# Patient Record
Sex: FEMALE | Race: WHITE | NOT HISPANIC OR LATINO | Employment: FULL TIME | URBAN - METROPOLITAN AREA
[De-identification: names, ages, dates, MRNs, and addresses within clinical notes are randomized per-mention and may not be internally consistent; named-entity substitution may affect disease eponyms.]

---

## 2017-12-05 ENCOUNTER — HOSPITAL ENCOUNTER (OUTPATIENT)
Facility: MEDICAL CENTER | Age: 27
End: 2017-12-05
Attending: FAMILY MEDICINE
Payer: COMMERCIAL

## 2017-12-05 ENCOUNTER — OFFICE VISIT (OUTPATIENT)
Dept: URGENT CARE | Facility: PHYSICIAN GROUP | Age: 27
End: 2017-12-05
Payer: COMMERCIAL

## 2017-12-05 VITALS
HEART RATE: 89 BPM | DIASTOLIC BLOOD PRESSURE: 72 MMHG | BODY MASS INDEX: 22.15 KG/M2 | WEIGHT: 125 LBS | OXYGEN SATURATION: 99 % | TEMPERATURE: 97.8 F | HEIGHT: 63 IN | RESPIRATION RATE: 12 BRPM | SYSTOLIC BLOOD PRESSURE: 124 MMHG

## 2017-12-05 DIAGNOSIS — R30.0 DYSURIA: ICD-10-CM

## 2017-12-05 DIAGNOSIS — A74.9 CHLAMYDIA: ICD-10-CM

## 2017-12-05 DIAGNOSIS — N30.00 ACUTE CYSTITIS WITHOUT HEMATURIA: ICD-10-CM

## 2017-12-05 LAB
APPEARANCE UR: CLEAR
BILIRUB UR STRIP-MCNC: NORMAL MG/DL
COLOR UR AUTO: NORMAL
GLUCOSE UR STRIP.AUTO-MCNC: NORMAL MG/DL
KETONES UR STRIP.AUTO-MCNC: NORMAL MG/DL
LEUKOCYTE ESTERASE UR QL STRIP.AUTO: NORMAL
NITRITE UR QL STRIP.AUTO: NORMAL
PH UR STRIP.AUTO: 5 [PH] (ref 5–8)
PROT UR QL STRIP: NORMAL MG/DL
RBC UR QL AUTO: NORMAL
SP GR UR STRIP.AUTO: 1
UROBILINOGEN UR STRIP-MCNC: NORMAL MG/DL

## 2017-12-05 PROCEDURE — 81002 URINALYSIS NONAUTO W/O SCOPE: CPT | Performed by: FAMILY MEDICINE

## 2017-12-05 PROCEDURE — 87086 URINE CULTURE/COLONY COUNT: CPT

## 2017-12-05 PROCEDURE — 87491 CHLMYD TRACH DNA AMP PROBE: CPT

## 2017-12-05 PROCEDURE — 87591 N.GONORRHOEAE DNA AMP PROB: CPT

## 2017-12-05 PROCEDURE — 87077 CULTURE AEROBIC IDENTIFY: CPT

## 2017-12-05 PROCEDURE — 99214 OFFICE O/P EST MOD 30 MIN: CPT | Performed by: FAMILY MEDICINE

## 2017-12-05 RX ORDER — SULFAMETHOXAZOLE AND TRIMETHOPRIM 800; 160 MG/1; MG/1
1 TABLET ORAL EVERY 12 HOURS
Qty: 10 TAB | Refills: 0 | Status: SHIPPED | OUTPATIENT
Start: 2017-12-05 | End: 2017-12-10

## 2017-12-05 ASSESSMENT — ENCOUNTER SYMPTOMS
FOCAL WEAKNESS: 0
SENSORY CHANGE: 0
WEIGHT LOSS: 0
EYE REDNESS: 0
EYE DISCHARGE: 0

## 2017-12-05 NOTE — PROGRESS NOTES
"Subjective:      Ramona Caputo is a 27 y.o. female who presents with Painful Urination (x 2 days)            2 days urinary burning and urgency. No frequency. No hematuria. Currently on menses. No vaginal discharge. No fever. No flank pain. No PMH pyelonephritis. OTC ibuprofen with some relief.           Review of Systems   Constitutional: Negative for malaise/fatigue and weight loss.   Eyes: Negative for discharge and redness.   Skin: Negative for itching and rash.   Neurological: Negative for sensory change and focal weakness.          Objective:     /72   Pulse 89   Temp 36.6 °C (97.8 °F)   Resp 12   Ht 1.6 m (5' 3\")   Wt 56.7 kg (125 lb)   LMP 11/30/2017   SpO2 99%   BMI 22.14 kg/m²      Physical Exam            Assessment/Plan:     There are no diagnoses linked to this encounter.      "

## 2017-12-05 NOTE — LETTER
December 5, 2017          To Whom it may concern      Ramona Caputo was seen in our office today.  Please excuse her for any missed work today                    Jacob Michael MD

## 2017-12-05 NOTE — PROGRESS NOTES
"Subjective:      Ramona Caputo is a 27 y.o. female who presents with Painful Urination (x 2 days)            2 days dysuria, frequency. No hematuria. Scant vaginal discharge and concerned about STI. Has appt with Gyn but would like to test now if possible. No fever. No flank pain. No PMH pyelonephritis. Sx's moderate severity. No OTC medications. No other aggravating or alleviating factors.          Review of Systems   Constitutional: Negative for malaise/fatigue and weight loss.   Eyes: Negative for discharge and redness.   Musculoskeletal: Negative for joint pain and myalgias.   Skin: Negative for itching and rash.          Objective:     /72   Pulse 89   Temp 36.6 °C (97.8 °F)   Resp 12   Ht 1.6 m (5' 3\")   Wt 56.7 kg (125 lb)   LMP 11/30/2017   SpO2 99%   BMI 22.14 kg/m²      Physical Exam   Constitutional: She appears well-developed and well-nourished. No distress.   HENT:   Head: Normocephalic and atraumatic.   Mouth/Throat: Oropharynx is clear and moist.   Eyes: Conjunctivae are normal.   Cardiovascular: Normal rate, regular rhythm and normal heart sounds.    Pulmonary/Chest: Effort normal and breath sounds normal.   Genitourinary:   Genitourinary Comments: Deferred per patient request. She notes no visible lesions or sores.    Neurological:   Speech is clear. Patient is appropriate and cooperative.     Skin: Skin is warm and dry. No rash noted.               Assessment/Plan:     Ua reviewed    1. Acute cystitis without hematuria  POCT Urinalysis    Urine Culture    sulfamethoxazole-trimethoprim (BACTRIM DS) 800-160 MG tablet   2. Dysuria  CHLAMYDIA/GC PCR URINE OR SWAB   3. Chlamydia  azithromycin (ZITHROMAX) 250 MG Tab     Differential diagnosis, natural history, supportive care, and indications for immediate follow-up discussed at length.   Bactrim was stopped after urine culture reviewed.       "

## 2017-12-06 ENCOUNTER — TELEPHONE (OUTPATIENT)
Dept: URGENT CARE | Facility: PHYSICIAN GROUP | Age: 27
End: 2017-12-06

## 2017-12-06 LAB
C TRACH DNA SPEC QL NAA+PROBE: POSITIVE
N GONORRHOEA DNA SPEC QL NAA+PROBE: NEGATIVE
SPECIMEN SOURCE: ABNORMAL

## 2017-12-07 LAB
BACTERIA UR CULT: ABNORMAL
BACTERIA UR CULT: ABNORMAL
SIGNIFICANT IND 70042: ABNORMAL
SOURCE SOURCE: ABNORMAL

## 2017-12-07 RX ORDER — AZITHROMYCIN 250 MG/1
1000 TABLET, FILM COATED ORAL ONCE
Qty: 4 TAB | Refills: 0 | Status: SHIPPED | OUTPATIENT
Start: 2017-12-07 | End: 2017-12-07

## 2017-12-14 ASSESSMENT — ENCOUNTER SYMPTOMS
MYALGIAS: 0
EYE REDNESS: 0
EYE DISCHARGE: 0
WEIGHT LOSS: 0

## 2019-10-23 ENCOUNTER — HOSPITAL ENCOUNTER (OUTPATIENT)
Dept: RADIOLOGY | Facility: MEDICAL CENTER | Age: 29
End: 2019-10-23
Attending: PHYSICIAN ASSISTANT
Payer: COMMERCIAL

## 2019-10-23 ENCOUNTER — OFFICE VISIT (OUTPATIENT)
Dept: URGENT CARE | Facility: PHYSICIAN GROUP | Age: 29
End: 2019-10-23
Payer: COMMERCIAL

## 2019-10-23 VITALS
TEMPERATURE: 99.1 F | BODY MASS INDEX: 22.61 KG/M2 | OXYGEN SATURATION: 99 % | HEIGHT: 63 IN | DIASTOLIC BLOOD PRESSURE: 64 MMHG | HEART RATE: 78 BPM | SYSTOLIC BLOOD PRESSURE: 118 MMHG | WEIGHT: 127.6 LBS | RESPIRATION RATE: 16 BRPM

## 2019-10-23 DIAGNOSIS — S16.1XXA STRAIN OF NECK MUSCLE, INITIAL ENCOUNTER: ICD-10-CM

## 2019-10-23 DIAGNOSIS — G44.319 ACUTE POST-TRAUMATIC HEADACHE, NOT INTRACTABLE: ICD-10-CM

## 2019-10-23 DIAGNOSIS — S09.90XA INJURY OF HEAD, INITIAL ENCOUNTER: ICD-10-CM

## 2019-10-23 PROCEDURE — 70450 CT HEAD/BRAIN W/O DYE: CPT

## 2019-10-23 PROCEDURE — 99214 OFFICE O/P EST MOD 30 MIN: CPT | Performed by: PHYSICIAN ASSISTANT

## 2019-10-23 RX ORDER — NITROFURANTOIN 25; 75 MG/1; MG/1
CAPSULE ORAL
Refills: 0 | COMMUNITY
Start: 2019-10-10 | End: 2019-10-23

## 2019-10-23 RX ORDER — IBUPROFEN 600 MG/1
600 TABLET ORAL EVERY 6 HOURS PRN
Qty: 15 TAB | Refills: 0 | Status: SHIPPED | OUTPATIENT
Start: 2019-10-23 | End: 2019-10-28

## 2019-10-23 RX ORDER — ACYCLOVIR 400 MG/1
TABLET ORAL
Refills: 3 | COMMUNITY
Start: 2019-10-04 | End: 2019-10-23

## 2019-10-23 RX ORDER — CYCLOBENZAPRINE HCL 10 MG
10 TABLET ORAL
Qty: 7 TAB | Refills: 0 | Status: SHIPPED | OUTPATIENT
Start: 2019-10-23 | End: 2019-10-30

## 2019-10-23 SDOH — HEALTH STABILITY: MENTAL HEALTH: HOW MANY STANDARD DRINKS CONTAINING ALCOHOL DO YOU HAVE ON A TYPICAL DAY?: 1 OR 2

## 2019-10-23 SDOH — HEALTH STABILITY: MENTAL HEALTH: HOW OFTEN DO YOU HAVE A DRINK CONTAINING ALCOHOL?: MONTHLY OR LESS

## 2019-10-23 SDOH — HEALTH STABILITY: MENTAL HEALTH: HOW OFTEN DO YOU HAVE 6 OR MORE DRINKS ON ONE OCCASION?: NEVER

## 2019-10-23 ASSESSMENT — ENCOUNTER SYMPTOMS
CONSTIPATION: 0
HEADACHES: 1
TINGLING: 0
NAUSEA: 0
FEVER: 0
SEIZURES: 0
DIARRHEA: 0
DOUBLE VISION: 0
MEMORY LOSS: 0
CHILLS: 0
LOSS OF CONSCIOUSNESS: 1
DIZZINESS: 1
NUMBNESS: 0
SENSORY CHANGE: 0
DISORIENTATION: 0
COUGH: 0
WEAKNESS: 0
VOMITING: 0
FOCAL WEAKNESS: 0
BLURRED VISION: 0
SHORTNESS OF BREATH: 0
ABDOMINAL PAIN: 0
TREMORS: 0
SPEECH CHANGE: 0

## 2019-10-23 NOTE — LETTER
October 23, 2019         Patient: Ramona Caputo   YOB: 1990   Date of Visit: 10/23/2019           To Whom it May Concern:    Ramona Caputo was seen in my clinic on 10/23/2019. She may return to work on 10/25/19 or sooner if symptoms improve.    If you have any questions or concerns, please don't hesitate to call.        Sincerely,           Nadja Olson P.A.-C.  Electronically Signed

## 2019-10-23 NOTE — PATIENT INSTRUCTIONS
Concussion, Adult  A concussion, or closed-head injury, is a brain injury caused by a direct blow to the head or by a quick and sudden movement (jolt) of the head or neck. Concussions are usually not life-threatening. Even so, the effects of a concussion can be serious. If you have had a concussion before, you are more likely to experience concussion-like symptoms after a direct blow to the head.  What are the causes?  This condition is caused by:  · Direct blow to the head, such as from running into another player during a soccer game, being hit in a fight, or hitting your head on a hard surface.  · A jolt of the head or neck that causes the brain to move back and forth inside the skull, such as in a car crash.  What are the signs or symptoms?  The signs of a concussion can be hard to notice. Early on, they may be missed by you, family members, and health care providers. You may look fine but act or feel differently.  Symptoms are usually temporary, but they may last for days, weeks, or even longer. Some symptoms may appear right away while others may not show up for hours or days. Every head injury is different. Symptoms may include:  · Mild to moderate headaches that will not go away.  · A feeling of pressure inside your head.  · Having more trouble than usual:  ¨ Learning or remembering things you have heard.  ¨ Answering questions.  ¨ Paying attention or concentrating.  ¨ Organizing daily tasks.  ¨ Making decisions and solving problems.  · Slowness in thinking, acting or reacting, speaking, or reading.  · Getting lost or being easily confused.  · Feeling tired all the time or lacking energy (fatigued).  · Feeling drowsy.  · Sleep disturbances.  ¨ Sleeping more than usual.  ¨ Sleeping less than usual.  ¨ Trouble falling asleep.  ¨ Trouble sleeping (insomnia).  · Loss of balance or feeling lightheaded or dizzy.  · Nausea or vomiting.  · Numbness or tingling.  · Increased sensitivity  to:  ¨ Sounds.  ¨ Lights.  ¨ Distractions.  · Vision problems or eyes that tire easily.  · Diminished sense of taste or smell.  · Ringing in the ears.  · Mood changes such as feeling sad or anxious.  · Becoming easily irritated or angry for little or no reason.  · Lack of motivation.  · Seeing or hearing things other people do not see or hear (hallucinations).  How is this diagnosed?  This condition is diagnosed based on:  · Medical history. Your health care provider will ask for a description of your activity and your injury.  · Symptoms. Your health care provider will ask whether you passed out (lost consciousness) and whether you are having trouble remembering events that happened right before and during your injury.  You may also have other tests, including:  · A brain scan to look for signs of injury to the brain. You may still have a concussion even if the scan shows no injury.  · Blood tests to be sure other problems are not present.  How is this treated?  This condition is usually treated in an emergency department, in an urgent care, or at a clinic.  · Tell your health care provider if:  ¨ You are taking any medicines, including prescription medicines, over-the-counter medicines, and natural remedies. Some medicines, such as blood thinners (anticoagulants) and aspirin, may increase the chance of complications, such as bleeding.  ¨ You are taking or have taken alcohol or illegal drugs. Alcohol and certain other drugs may slow your recovery and can put you at risk of further injury.  · Your health care provider will send you home with important instructions to follow.  · How fast you will recover from a concussion depends on many factors, such as how severe your concussion is, what part of your brain was injured, how old you are, and how healthy you were before the concussion. Recovery can take time.  ¨ Most people with mild injuries recover fully.  ¨ Recovery is slower in older people.  ¨ People who have had  a concussion in the past or have other medical problems may take longer to recover.  Follow these instructions at home:  Activity  · Limit activities that require a lot of thought or concentration. These include:  ¨ Doing homework or job-related work.  ¨ Watching TV.  ¨ Working on the computer.  · Rest. Rest helps the brain to heal. Make sure you:  ¨ Get plenty of sleep at night. Avoid staying up late at night.  ¨ Keep the same bedtime hours on weekends and weekdays.  ¨ Rest during the day. Take daytime naps or rest breaks when you feel tired.  · Avoid situations that increase your risk for another head injury (football, hockey, soccer, basketball, martial arts, downhill snow sports, and horseback riding). Your condition will get worse every time you experience a concussion.  · Ask your health care provider when you can return to your normal activities, such as school, work, athletics, driving, riding a bicycle, or operating heavy machinery. Your ability to react may be slower after a brain injury. Never do these activities if you are dizzy.  · Return to your normal activities slowly, not all at once. You must give your body and brain enough time for recovery.  Preventing another concussion   It is very important to avoid another brain injury, especially as you recover. In rare cases, another injury can lead to permanent brain damage, brain swelling, or death. The risk of this is greatest during the first 7-10 days after a head injury. Avoid injuries by:  · Wearing a seat belt when riding in a car.  · Limiting alcohol intake to no more than 1 drink a day for non-pregnant women and 2 drinks a day for men. One drink equals 12 oz. of beer, 5 oz. of wine, or 1½ oz. of hard liquor.  · Wearing a helmet when biking, skiing, skateboarding, skating, or doing similar activities.  · Avoiding activities that could lead to a second concussion, such as contact or recreational sports, until your health care provider says it is  okay.  · Taking safety measures in your home.  ¨ Remove clutter and tripping hazards from floors and stairways.  ¨ Use grab bars in bathrooms and handrails by stairs.  ¨ Place non-slip mats on floors and in bathtubs.  ¨ Improve lighting in dim areas.  General instructions  · Take over-the-counter and prescription medicines only as told by your health care provider.  · Do not drink alcohol until your health care provider says you are well enough to do so.  · If it is harder than usual to remember things, write them down.  · If you are easily distracted, try to do one thing at a time. For example, do not try to watch TV while fixing dinner.  · Talk with family members or close friends when making important decisions.  · Watch your symptoms and tell others to do the same. Complications sometimes occur after a concussion. Older adults with a brain injury may have a higher risk of serious complications, such as a blood clot on the brain.  · Tell your teachers, school nurse, school counselor, , , or  about your injury, symptoms, and restrictions. Tell them about what you can or cannot do. They should watch for:  ¨ Increased problems with attention or concentration.  ¨ Increased difficulty remembering or learning new information.  ¨ Increased time needed to complete tasks or assignments.  ¨ Increased irritability or decreased ability to cope with stress.  ¨ Increased symptoms.  · Keep all follow-up visits as told by your health care provider. This is important because repeated evaluation of your symptoms is recommended for your recovery.  Contact a health care provider if:  · You have increased problems paying attention or concentrating.  · You have increased difficulty remembering or learning new information.  · You need more time to complete tasks or assignments than before.  · You have increased irritability or decreased ability to cope with stress.  · You have more symptoms than  before.  Seek medical care if you have any of the following symptoms for more than 2 weeks after your injury:  · Long-lasting (chronic) headaches.  · Dizziness or balance problems.  · Nausea.  · Vision problems.  · Increased sensitivity to noise or light.  · Depression or mood swings.  · Anxiety or irritability.  · Memory problems.  · Difficulty concentrating or paying attention.  · Sleep problems.  · Feeling tired all the time.  Get help right away if:  · You have severe or worsening headaches. These may be a sign of a blood clot in the brain.  · You have weakness (even if only in one hand, leg, or part of the face).  · You have numbness.  · You have decreased coordination.  · You vomit repeatedly.  · You have increased sleepiness.  · One pupil is larger than the other.  · You have convulsions.  · You have slurred speech.  · You have increased confusion. This may be a sign of a blood clot in the brain.  · You have increased restlessness, agitation, or irritability.  · You are unable to recognize people or places.  · You have neck pain.  · It is difficult to wake you up.  · You have unusual behavior changes.  · You lose consciousness.  This information is not intended to replace advice given to you by your health care provider. Make sure you discuss any questions you have with your health care provider.  Document Released: 03/09/2005 Document Revised: 05/25/2017 Document Reviewed: 07/10/2014  [employer] Interactive Patient Education © 2017 [employer]        Cervical Strain and Sprain Rehab  Ask your health care provider which exercises are safe for you. Do exercises exactly as told by your health care provider and adjust them as directed. It is normal to feel mild stretching, pulling, tightness, or discomfort as you do these exercises, but you should stop right away if you feel sudden pain or your pain gets worse. Do not begin these exercises until told by your health care provider.  Stretching and range of motion  exercises  These exercises warm up your muscles and joints and improve the movement and flexibility of your neck. These exercises also help to relieve pain, numbness, and tingling.  Exercise A: Cervical side bend  1. Using good posture, sit on a stable chair or stand up.  2. Without moving your shoulders, slowly tilt your left / right ear to your shoulder until you feel a stretch in your neck muscles. You should be looking straight ahead.  3. Hold for __________ seconds.  4. Repeat with the other side of your neck.  Repeat __________ times. Complete this exercise __________ times a day.  Exercise B: Cervical rotation  1. Using good posture, sit on a stable chair or stand up.  2. Slowly turn your head to the side as if you are looking over your left / right shoulder.  ¨ Keep your eyes level with the ground.  ¨ Stop when you feel a stretch along the side and the back of your neck.  3. Hold for __________ seconds.  4. Repeat this by turning to your other side.  Repeat __________ times. Complete this exercise __________ times a day.  Exercise C: Thoracic extension and pectoral stretch  1. Roll a towel or a small blanket so it is about 4 inches (10 cm) in diameter.  2. Lie down on your back on a firm surface.  3. Put the towel lengthwise, under your spine in the middle of your back. It should not be not under your shoulder blades. The towel should line up with your spine from your middle back to your lower back.  4. Put your hands behind your head and let your elbows fall out to your sides.  5. Hold for __________ seconds.  Repeat __________ times. Complete this exercise __________ times a day.  Strengthening exercises  These exercises build strength and endurance in your neck. Endurance is the ability to use your muscles for a long time, even after your muscles get tired.  Exercise D: Upper cervical flexion, isometric  1. Lie on your back with a thin pillow behind your head and a small rolled-up towel under your  neck.  2. Gently tuck your chin toward your chest and nod your head down to look toward your feet. Do not lift your head off the pillow.  3. Hold for __________ seconds.  4. Release the tension slowly. Relax your neck muscles completely before you repeat this exercise.  Repeat __________ times. Complete this exercise __________ times a day.  Exercise E: Cervical extension, isometric  1. Stand about 6 inches (15 cm) away from a wall, with your back facing the wall.  2. Place a soft object, about 6-8 inches (15-20 cm) in diameter, between the back of your head and the wall. A soft object could be a small pillow, a ball, or a folded towel.  3. Gently tilt your head back and press into the soft object. Keep your jaw and forehead relaxed.  4. Hold for __________ seconds.  5. Release the tension slowly. Relax your neck muscles completely before you repeat this exercise.  Repeat __________ times. Complete this exercise __________ times a day.  Posture and body mechanics     Body mechanics refers to the movements and positions of your body while you do your daily activities. Posture is part of body mechanics. Good posture and healthy body mechanics can help to relieve stress in your body's tissues and joints. Good posture means that your spine is in its natural S-curve position (your spine is neutral), your shoulders are pulled back slightly, and your head is not tipped forward. The following are general guidelines for applying improved posture and body mechanics to your everyday activities.  Standing  · When standing, keep your spine neutral and keep your feet about hip-width apart. Keep a slight bend in your knees. Your ears, shoulders, and hips should line up.  · When you do a task in which you  one place for a long time, place one foot up on a stable object that is 2-4 inches (5-10 cm) high, such as a footstool. This helps keep your spine neutral.  Sitting  · When sitting, keep your spine neutral and your keep  feet flat on the floor. Use a footrest, if necessary, and keep your thighs parallel to the floor. Avoid rounding your shoulders, and avoid tilting your head forward.  · When working at a desk or a computer, keep your desk at a height where your hands are slightly lower than your elbows. Slide your chair under your desk so you are close enough to maintain good posture.  · When working at a computer, place your monitor at a height where you are looking straight ahead and you do not have to tilt your head forward or downward to look at the screen.  Resting  When lying down and resting, avoid positions that are most painful for you. Try to support your neck in a neutral position. You can use a contour pillow or a small rolled-up towel. Your pillow should support your neck but not push on it.  This information is not intended to replace advice given to you by your health care provider. Make sure you discuss any questions you have with your health care provider.  Document Released: 12/18/2006 Document Revised: 08/24/2017 Document Reviewed: 11/23/2016  [employer] Interactive Patient Education © 2017 [employer]

## 2019-10-23 NOTE — PROGRESS NOTES
Subjective:   Ramona Caputo is a 29 y.o. female who presents for Head Injury (Fell off son's Skateboard yesterday and was knocked out)        Head Injury    The incident occurred 12 to 24 hours ago. The injury mechanism was a fall. She lost consciousness for a period of less than one minute. The quality of the pain is described as band-like. Associated symptoms include headaches. Pertinent negatives include no blurred vision, disorientation, memory loss, numbness, vomiting or weakness. She has tried NSAIDs for the symptoms. The treatment provided mild relief.     The patient was standing on her son's skateboard when she fell backwards hitting the back of her head on cement.  She did lose consciousness, she is unsure of how long but notes under 1 minute.  She has not had any vomiting, visual changes.  She has had a headache described as bandlike and tenderness to the posterior scalp where she impacted.    She is felt tired today.  She was able to drive to clinic alone.  No previous head injuries.  No use of blood thinners or aspirin.    Review of Systems   Constitutional: Positive for malaise/fatigue. Negative for chills and fever.   Eyes: Negative for blurred vision and double vision.   Respiratory: Negative for cough and shortness of breath.    Gastrointestinal: Negative for abdominal pain, constipation, diarrhea, nausea and vomiting.   Neurological: Positive for dizziness, loss of consciousness (<1 minutes) and headaches. Negative for tingling, tremors, sensory change, speech change, focal weakness, seizures, weakness and numbness.   Psychiatric/Behavioral: Negative for memory loss.   All other systems reviewed and are negative.      PMH:  has no past medical history on file.  MEDS:   Current Outpatient Medications:   •  ibuprofen (MOTRIN) 600 MG Tab, Take 1 Tab by mouth every 6 hours as needed for up to 5 days., Disp: 15 Tab, Rfl: 0  •  cyclobenzaprine (FLEXERIL) 10 MG Tab, Take 1 Tab by mouth every bedtime  "for 7 days., Disp: 7 Tab, Rfl: 0  •  acyclovir (ZOVIRAX) 400 MG tablet, TK 1 T PO TID, Disp: , Rfl: 3  •  nitrofurantoin monohyd macro (MACROBID) 100 MG Cap, TK 1 C PO BID, Disp: , Rfl: 0  ALLERGIES: No Known Allergies  SURGHX:   Past Surgical History:   Procedure Laterality Date   • MAMMOPLASTY AUGMENTATION  2014    Performed by Roxana Lamb M.D. at [facility]   • MASTOPEXY  2014    Performed by Rox[facility] Lamb M.D. at [facility]   • SEPTOPLASTY     • OTHER SURGICAL PROCEDURE  ;     ear drum repair   • PRIMARY C SECTION  10/17/09;10/29/12         SOCHX:  reports that she has never smoked. She has never used smokeless tobacco. She reports that she drinks alcohol. She reports that she does not use drugs.  Family History   Problem Relation Age of Onset   • Heart Disease Paternal Grandfather 65        unknown type of heart disease        Objective:   /64 (BP Location: Left arm, Patient Position: Sitting, BP Cuff Size: Adult)   Pulse 78   Temp 37.3 °C (99.1 °F) (Temporal)   Resp 16   Ht 1.6 m (5' 3\")   Wt 57.9 kg (127 lb 9.6 oz)   SpO2 99%   BMI 22.60 kg/m²     Physical Exam   Constitutional: She is oriented to person, place, and time. She appears well-developed and well-nourished. No distress.   HENT:   Head: Normocephalic and atraumatic.   Right Ear: External ear normal.   Left Ear: External ear normal.   Nose: Nose normal.   Eyes: Pupils are equal, round, and reactive to light. Conjunctivae and EOM are normal.   Neck: Neck supple. Muscular tenderness present. No spinous process tenderness present. No neck rigidity. Decreased range of motion present. No tracheal deviation present.   Cardiovascular: Normal rate and regular rhythm.   Pulmonary/Chest: Effort normal and breath sounds normal. No respiratory distress.   Lymphadenopathy:     She has no cervical adenopathy.   Neurological: She is alert and oriented to person, place, and " time. She has normal strength. She is not disoriented. She displays no atrophy and no tremor. No cranial nerve deficit or sensory deficit. She exhibits normal muscle tone. She displays a negative Romberg sign. Coordination and gait normal.   Skin: Skin is warm and dry. Capillary refill takes less than 2 seconds.   Psychiatric: She has a normal mood and affect. Her behavior is normal.   Vitals reviewed.      CT:   Impression       No acute intracranial abnormality.             Assessment/Plan:     1. Acute post-traumatic headache, not intractable  CT-HEAD W/O    ibuprofen (MOTRIN) 600 MG Tab   2. Injury of head, initial encounter     3. Strain of neck muscle, initial encounter  cyclobenzaprine (FLEXERIL) 10 MG Tab     Supportive care reviewed. Concussion and cervical spine exercise handout provided.     Follow-up with primary care provider.  If symptoms worsen or persist patient can return to clinic for reevaluation.  Red flags and STRICT emergency room precautions discussed. All side effects of medication discussed including allergic response, GI upset, tendon injury, etc. Patient confirmed understanding of information.    Please note that this dictation was created using voice recognition software. I have made every reasonable attempt to correct obvious errors, but I expect that there are errors of grammar and possibly content that I did not discover before finalizing the note.

## 2020-08-31 ENCOUNTER — OFFICE VISIT (OUTPATIENT)
Dept: MEDICAL GROUP | Facility: PHYSICIAN GROUP | Age: 30
End: 2020-08-31
Payer: COMMERCIAL

## 2020-08-31 VITALS
SYSTOLIC BLOOD PRESSURE: 120 MMHG | HEIGHT: 63 IN | HEART RATE: 86 BPM | OXYGEN SATURATION: 100 % | TEMPERATURE: 97.7 F | RESPIRATION RATE: 16 BRPM | WEIGHT: 130 LBS | DIASTOLIC BLOOD PRESSURE: 80 MMHG | BODY MASS INDEX: 23.04 KG/M2

## 2020-08-31 DIAGNOSIS — F41.1 GAD (GENERALIZED ANXIETY DISORDER): ICD-10-CM

## 2020-08-31 DIAGNOSIS — Z76.89 ENCOUNTER TO ESTABLISH CARE: ICD-10-CM

## 2020-08-31 PROCEDURE — 99214 OFFICE O/P EST MOD 30 MIN: CPT | Performed by: NURSE PRACTITIONER

## 2020-08-31 RX ORDER — ACYCLOVIR 200 MG/1
CAPSULE ORAL
COMMUNITY
Start: 2020-06-11 | End: 2021-06-02

## 2020-08-31 RX ORDER — ALPRAZOLAM 0.5 MG/1
TABLET ORAL
COMMUNITY
Start: 2020-07-13 | End: 2020-08-31

## 2020-08-31 RX ORDER — ALPRAZOLAM 0.5 MG/1
0.5 TABLET ORAL 2 TIMES DAILY PRN
Qty: 60 TAB | Refills: 0 | Status: SHIPPED | OUTPATIENT
Start: 2020-08-31 | End: 2020-09-30

## 2020-08-31 ASSESSMENT — ANXIETY QUESTIONNAIRES
2. NOT BEING ABLE TO STOP OR CONTROL WORRYING: MORE THAN HALF THE DAYS
4. TROUBLE RELAXING: MORE THAN HALF THE DAYS
3. WORRYING TOO MUCH ABOUT DIFFERENT THINGS: MORE THAN HALF THE DAYS
6. BECOMING EASILY ANNOYED OR IRRITABLE: SEVERAL DAYS
5. BEING SO RESTLESS THAT IT IS HARD TO SIT STILL: NOT AT ALL
7. FEELING AFRAID AS IF SOMETHING AWFUL MIGHT HAPPEN: NOT AT ALL
1. FEELING NERVOUS, ANXIOUS, OR ON EDGE: NEARLY EVERY DAY
GAD7 TOTAL SCORE: 10

## 2020-08-31 ASSESSMENT — PATIENT HEALTH QUESTIONNAIRE - PHQ9: CLINICAL INTERPRETATION OF PHQ2 SCORE: 0

## 2020-08-31 NOTE — PROGRESS NOTES
Subjective:     CC:  Diagnoses of Encounter to establish care and SABA (generalized anxiety disorder) were pertinent to this visit.    HISTORY OF THE PRESENT ILLNESS: Patient is a 30 y.o. female. This pleasant patient is here today to establish care and discuss the following. She does not have prior PCP. Her OB/GYN is Dr. Bruce Raman with [facility].     SABA (generalized anxiety disorder)  Chronic medical problem. She takes Xanax 0.5 mg as needed. This was prescribed by her OB/GYN until she could follow-up with PCP. During the week on average she will take 3 times. She has tried a previous antidepressant that made her feel depressed and groggy. Her last dose of Xanax was yesterday. Her SABA score today is 10. She denies any self harm or SI today. 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       Allergies: Patient has no known allergies.    Current Outpatient Medications Ordered in Epic   Medication Sig Dispense Refill   • ALPRAZolam (XANAX) 0.5 MG Tab Take 1 Tab by mouth 2 times a day as needed for Anxiety for up to 30 days. 60 Tab 0   • acyclovir (ZOVIRAX) 200 MG Cap        No current Epic-ordered facility-administered medications on file.        Past Medical History:   Diagnosis Date   • Bronchitis 2015   • Other plastic surgery for unacceptable cosmetic appearance 2014       Past Surgical History:   Procedure Laterality Date   • MAMMOPLASTY AUGMENTATION  2014    Performed by Roxana Lamb M.D. at [facility]   • MASTOPEXY  2014    Performed by Roxana Lamb M.D. at [facility]   • SEPTOPLASTY     • OTHER SURGICAL PROCEDURE Right ;     ear drum repair   • PRIMARY C SECTION  10/17/09;10/29/12     x 2       Social History     Tobacco Use   • Smoking status: Never Smoker   • Smokeless tobacco: Never Used   Substance Use Topics   • Alcohol use: Yes     Frequency: Monthly or less     Drinks per  "session: 1 or 2     Binge frequency: Never     Comment: 1-2 per month   • Drug use: No       Social History     Social History Narrative   • Not on file       Family History   Problem Relation Age of Onset   • No Known Problems Mother    • No Known Problems Father    • Heart Disease Paternal Grandfather 65        unknown type of heart disease   • No Known Problems Maternal Grandmother    • No Known Problems Maternal Grandfather    • No Known Problems Paternal Grandmother    • No Known Problems Son    • No Known Problems Son        Health Maintenance: due for influenza. Due for Pap. We will request last Pap from her OB/GYN office.     ROS:   Gen: no fevers/chills, no changes in weight  Eyes: no changes in vision  ENT: no sore throat, no ear pain  Pulm: no sob, no cough  CV: no chest pain, no palpitations  GI: no nausea/vomiting, no diarrhea  : no dysuria  MSk: no myalgias  Skin: no rash  Neuro: no headaches, no numbness/tingling  Heme/Lymph: no easy bruising      Objective:     Vital signs reviewed.  Exam: /80 (BP Location: Left arm, Patient Position: Sitting, BP Cuff Size: Adult)   Pulse 86   Temp 36.5 °C (97.7 °F) (Temporal)   Resp 16   Ht 1.6 m (5' 3\")   Wt 59 kg (130 lb)   SpO2 100%  Body mass index is 23.03 kg/m².    General: Normal appearing. No distress.  HENT: Normocephalic. Ears normal shape and contour, canals are clear bilaterally, tympanic membranes are benign.   Eyes: Eyes conjunctiva clear lids without ptosis, pupils equal and reactive to light accommodation, lids normal.  Neck: Supple without JVD. Thyroid is not enlarged.  Pulmonary: Clear to ausculation.  Normal effort. No rales, ronchi, or wheezing.  Cardiovascular: Regular rate and rhythm without murmur. Radial pulses are intact and equal bilaterally.  Abdomen: Soft, nontender, nondistended. Normal bowel sounds. Liver and spleen are not palpable.  Neurologic: Grossly nonfocal  Lymph: No cervical or supraclavicular lymph nodes are " palpable  Skin: Warm and dry.  No obvious lesions.  Musculoskeletal: Normal gait. No extremity cyanosis, clubbing, or edema.  Psych: Normal mood and affect. Alert and oriented x3. Judgment and insight is normal.      Assessment & Plan:   30 y.o. female with the following -    1. Encounter to establish care  New problem to examiner. Care established.     2. SABA (generalized anxiety disorder)  New problem to examiner. Continue Xanax, medication refilled. PDMP reviewed today.  Discussed controlled substance treatment agreement patient, she reports understanding.  She denies any self-harm or SI today.  Her saba score today is 10.  Monitor and follow.  - Controlled Substance Treatment Agreement  - ALPRAZolam (XANAX) 0.5 MG Tab; Take 1 Tab by mouth 2 times a day as needed for Anxiety for up to 30 days.  Dispense: 60 Tab; Refill: 0      Return in about 3 months (around 11/30/2020) for SABA, UDS.    Please note that this dictation was created using voice recognition software. I have made every reasonable attempt to correct obvious errors, but I expect that there are errors of grammar and possibly content that I did not discover before finalizing the note.

## 2020-08-31 NOTE — LETTER
[email or web address]  ABDOUL TinocoP.R.N.  [address]  [place] [address]  Fax: [phone]   Authorization for Release/Disclosure of   Protected Health Information   Name: TEJAL CASAS : 1990 SSN: [id number]   Address: [facility] [address]  [place] [address] Phone:    [phone] (home)    I authorize the entity listed below to release/disclose the PHI below to:   [facility]/ANNE Tinoco.P.R.NVinicius and LAWANDA TinocoRJULIAN   Provider or Entity Name:  [facility]  Dr. Bruce Raman      Address   City, State, [facility]   Phone:      Fax:     Reason for request: continuity of care   Information to be released:    [  ] LAST COLONOSCOPY,  including any PATH REPORT and follow-up  [  ] LAST FIT/COLOGUARD RESULT [  ] LAST DEXA  [  ] LAST MAMMOGRAM  [x] LAST PAP  [  ] LAST LABS [  ] RETINA EXAM REPORT  [  ] IMMUNIZATION RECORDS  [  ] Release all info      [  ] Check here and initial the line next to each item to release ALL health information INCLUDING  _____ Care and treatment for drug and / or alcohol abuse  _____ HIV testing, infection status, or AIDS  _____ Genetic Testing    DATES OF SERVICE OR TIME PERIOD TO BE DISCLOSED: _____________  I understand and acknowledge that:  * This Authorization may be revoked at any time by you in writing, except if your health information has already been used or disclosed.  * Your health information that will be used or disclosed as a result of you signing this authorization could be re-disclosed by the recipient. If this occurs, your re-disclosed health information may no longer be protected by State or Federal laws.  * You may refuse to sign this Authorization. Your refusal will not affect your ability to obtain treatment.  * This Authorization becomes effective upon signing and will  on (date) __________.      If no date is indicated, this Authorization will  one (1) year from the signature date.    Name: Tejal  Domenico    Signature:   Date:     8/31/2020       PLEASE FAX REQUESTED RECORDS BACK TO: [phone]

## 2020-08-31 NOTE — ASSESSMENT & PLAN NOTE
Chronic medical problem. She takes Xanax 0.5 mg as needed. This was prescribed by her OB/GYN until she could follow-up with PCP. During the week on average she will take 3 times. She has tried a previous antidepressant that made her feel depressed and groggy. Her last dose of Xanax was yesterday. Her SABA score today is 10. She denies any self harm or SI today. 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

## 2020-11-06 ENCOUNTER — OFFICE VISIT (OUTPATIENT)
Dept: MEDICAL GROUP | Facility: PHYSICIAN GROUP | Age: 30
End: 2020-11-06

## 2020-11-06 VITALS
SYSTOLIC BLOOD PRESSURE: 116 MMHG | HEIGHT: 63 IN | DIASTOLIC BLOOD PRESSURE: 78 MMHG | WEIGHT: 135 LBS | TEMPERATURE: 98.5 F | HEART RATE: 90 BPM | BODY MASS INDEX: 23.92 KG/M2 | RESPIRATION RATE: 16 BRPM | OXYGEN SATURATION: 98 %

## 2020-11-06 DIAGNOSIS — R42 INTERMITTENT LIGHTHEADEDNESS: ICD-10-CM

## 2020-11-06 DIAGNOSIS — F41.1 GAD (GENERALIZED ANXIETY DISORDER): ICD-10-CM

## 2020-11-06 PROCEDURE — 99214 OFFICE O/P EST MOD 30 MIN: CPT | Performed by: NURSE PRACTITIONER

## 2020-11-06 RX ORDER — ALPRAZOLAM 0.5 MG/1
0.5 TABLET ORAL 2 TIMES DAILY PRN
Qty: 60 TAB | Refills: 0 | Status: SHIPPED | OUTPATIENT
Start: 2020-11-06 | End: 2020-12-18

## 2020-11-06 ASSESSMENT — ANXIETY QUESTIONNAIRES
4. TROUBLE RELAXING: SEVERAL DAYS
6. BECOMING EASILY ANNOYED OR IRRITABLE: SEVERAL DAYS
2. NOT BEING ABLE TO STOP OR CONTROL WORRYING: SEVERAL DAYS
7. FEELING AFRAID AS IF SOMETHING AWFUL MIGHT HAPPEN: SEVERAL DAYS
3. WORRYING TOO MUCH ABOUT DIFFERENT THINGS: MORE THAN HALF THE DAYS
GAD7 TOTAL SCORE: 9
5. BEING SO RESTLESS THAT IT IS HARD TO SIT STILL: SEVERAL DAYS
1. FEELING NERVOUS, ANXIOUS, OR ON EDGE: MORE THAN HALF THE DAYS

## 2020-11-06 NOTE — PROGRESS NOTES
Subjective:     CC: Urine drug test, anxiety, lightheadedness    HPI:   Ramona presents today with the following:    ANNA (generalized anxiety disorder)  Chronic medical problem. She is taking Xanax 0.5 mg PRN. On average she takes 4  Days a week. 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 Her sleeping has improved and is averaging 6-7 hours nightly. She is eating healthy diet and exercising. Her anna score is 9 today, previously 10.  She denies any self-harm or SI today.  She would like a medication refill today.  She is due for UDS today.    Intermittent lightheadedness  New problem to examiner. She noticed 1 year ago that she was having lightleadness prior to her menses. The lightheadedness typically lasts 3 days. Her menses is moderate in bleeding and last 4-5 days. Her LMP was 10/26/2020. She has started taking OTC iron supplement with her menses that has been helping. Denies chest pain, shortness of breath, palpations or headaches. She recently started a new job and is in process of obtaining health insurance.        Past Medical History:   Diagnosis Date   • Bronchitis 8/25/2015   • Other plastic surgery for unacceptable cosmetic appearance 2/12/2014       Social History     Tobacco Use   • Smoking status: Never Smoker   • Smokeless tobacco: Never Used   Substance Use Topics   • Alcohol use: Yes     Frequency: Monthly or less     Drinks per session: 1 or 2     Binge frequency: Never     Comment: 1-2 per month   • Drug use: No       Current Outpatient Medications Ordered in Epic   Medication Sig Dispense Refill   • ALPRAZolam (XANAX) 0.5 MG Tab Take 1 Tab by mouth 2 times a day as needed for Anxiety for up to 30 days. 60 Tab 0   • acyclovir (ZOVIRAX) 200 MG Cap        No current Epic-ordered facility-administered medications on file.        Allergies:  Patient has no known allergies.    Health Maintenance: Completed, records from OB  "reviewed. Last pap 7/13/2020 normal.     ROS:  Gen: no fevers/chills, no changes in weight  Eyes: no blurry vision  ENT: no sore throat  Pulm: no sob, no cough  CV: no chest pain, no palpitations  GI: no nausea/vomiting, no diarrhea  MSk: no myalgias  Neuro: no headaches, no dizziness    Objective:     Vital signs reviewed  Exam:  /78 (BP Location: Left arm, Patient Position: Sitting, BP Cuff Size: Adult)   Pulse 90   Temp 36.9 °C (98.5 °F) (Temporal)   Resp 16   Ht 1.6 m (5' 3\")   Wt 61.2 kg (135 lb)   LMP 10/26/2020   SpO2 98%   BMI 23.91 kg/m²  Body mass index is 23.91 kg/m².    Gen: Alert and oriented, No apparent distress.  Eyes:   Lids normal. Glasses in place.   Neck: Neck is supple without lymphadenopathy.  Lungs: Normal effort, CTA bilaterally, no wheezes, rhonchi, or rales  CV: Regular rate and rhythm. No murmurs, rubs, or gallops.  Ext: No clubbing, cyanosis, edema.      Assessment & Plan:     30 y.o. female with the following -     1. SABA (generalized anxiety disorder)  Chronic stable medical problem.  Continue alprazolam, medication electronically refilled and sent to her pharmacy.  PDMP reviewed today.  She is up-to-date on her controlled substance treatment agreement. UDS not completed today, she does not have health insurance at this time. Out of pocket cost is $100 and she is unable to pay added expense. Discussed that when she gets health insurance again that she will make appointment to have UDS completed. I will continue to refill alprazolam. She denies any self-harm or SI today.  Red flags discussed.  Monitor and follow.  - Pain Management Screen; Future  - ALPRAZolam (XANAX) 0.5 MG Tab; Take 1 Tab by mouth 2 times a day as needed for Anxiety for up to 30 days.  Dispense: 60 Tab; Refill: 0    2. Intermittent lightheadedness  New problem to examiner.  Last labs completed were in 2016.  Discussed with patient to check labs as below to rule out any anemia, thyroid abnormalities, " electrolyte abnormalities, or iron deficiency.  She is in agreement.  She states that she has recent started a new job and does not have health insurance and would like to wait to complete labs until she has health insurance.  We are in agreement.  Orders provided today.  Discussed she may take over-the-counter iron supplement.  Discussed diet and lifestyle modifications.  Red flags discussed.  Monitor and follow.  - CBC WITH DIFFERENTIAL; Future  - Comp Metabolic Panel; Future  - TSH WITH REFLEX TO FT4; Future  - IRON; Future  - FERRITIN; Future      Return in about 6 months (around 5/6/2021) for med refill .    Please note that this dictation was created using voice recognition software. I have made every reasonable attempt to correct obvious errors, but I expect that there are errors of grammar and possibly content that I did not discover before finalizing the note.

## 2020-11-06 NOTE — ASSESSMENT & PLAN NOTE
Chronic medical problem. She is taking Xanax 0.5 mg PRN. On average she takes 4  Days a week. 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 Her sleeping has improved and is averaging 6-7 hours nightly. She is eating healthy diet and exercising. Her anna score is 9 today, previously 10.  She denies any self-harm or SI today.  She would like a medication refill today.  She is due for UDS today.

## 2020-11-06 NOTE — ASSESSMENT & PLAN NOTE
New problem to examiner. She noticed 1 year ago that she was having lightleadness prior to her menses. The lightheadedness typically lasts 3 days. Her menses is moderate in bleeding and last 4-5 days. Her LMP was 10/26/2020. She has started taking OTC iron supplement with her menses that has been helping. Denies chest pain, shortness of breath, palpations or headaches. She recently started a new job and is in process of obtaining health insurance.

## 2020-12-18 DIAGNOSIS — F41.1 GAD (GENERALIZED ANXIETY DISORDER): ICD-10-CM

## 2020-12-18 NOTE — TELEPHONE ENCOUNTER
Received request via: Pharmacy    Was the patient seen in the last year in this department? Yes LOV 11/06/2020    Does the patient have an active prescription (recently filled or refills available) for medication(s) requested? No

## 2020-12-21 RX ORDER — ALPRAZOLAM 0.5 MG/1
TABLET ORAL
Qty: 60 TAB | Refills: 0 | Status: SHIPPED | OUTPATIENT
Start: 2020-12-21 | End: 2021-02-04

## 2020-12-21 NOTE — TELEPHONE ENCOUNTER
Requested Prescriptions     Signed Prescriptions Disp Refills   • ALPRAZolam (XANAX) 0.5 MG Tab 60 Tab 0     Sig: TAKE 1 TABLET BY MOUTH 2 TIMES A DAY AS NEEDED FOR ANXIETY FOR UP TO 30 DAYS.     Authorizing Provider: EFREN IRENE reviewed today    TAMICA Tinoco.

## 2021-02-04 DIAGNOSIS — F41.1 GAD (GENERALIZED ANXIETY DISORDER): ICD-10-CM

## 2021-02-04 RX ORDER — ALPRAZOLAM 0.5 MG/1
TABLET ORAL
Qty: 60 TAB | Refills: 0 | Status: SHIPPED | OUTPATIENT
Start: 2021-02-04 | End: 2021-03-10

## 2021-03-10 DIAGNOSIS — F41.1 GAD (GENERALIZED ANXIETY DISORDER): ICD-10-CM

## 2021-03-10 RX ORDER — ALPRAZOLAM 0.5 MG/1
TABLET ORAL
Qty: 60 TABLET | Refills: 0 | Status: SHIPPED | OUTPATIENT
Start: 2021-03-10 | End: 2021-04-15

## 2021-03-10 NOTE — TELEPHONE ENCOUNTER
Requested Prescriptions     Signed Prescriptions Disp Refills   • ALPRAZolam (XANAX) 0.5 MG Tab 60 tablet 0     Sig: TAKE 1 TABLET BY MOUTH 2 TIMES A DAY AS NEEDED FOR ANXIETY FOR UP TO 30 DAYS.     Authorizing Provider: ALTON JOHNSON A.P.R.N.

## 2021-04-15 DIAGNOSIS — F41.1 GAD (GENERALIZED ANXIETY DISORDER): ICD-10-CM

## 2021-04-15 RX ORDER — ALPRAZOLAM 0.5 MG/1
TABLET ORAL
Qty: 60 TABLET | Refills: 0 | Status: SHIPPED | OUTPATIENT
Start: 2021-04-15 | End: 2021-05-20

## 2021-04-15 NOTE — TELEPHONE ENCOUNTER
Requested Prescriptions     Signed Prescriptions Disp Refills   • ALPRAZolam (XANAX) 0.5 MG Tab 60 tablet 0     Sig: TAKE 1 TABLET BY MOUTH 2 TIMES A DAY AS NEEDED FOR ANXIETY FOR UP TO 30 DAYS.     Authorizing Provider: EFREN IRENE reviewed today.    TAMICA Tinoco.

## 2021-05-20 DIAGNOSIS — F41.1 GAD (GENERALIZED ANXIETY DISORDER): ICD-10-CM

## 2021-05-20 RX ORDER — ALPRAZOLAM 0.5 MG/1
TABLET ORAL
Qty: 60 TABLET | Refills: 0 | Status: SHIPPED | OUTPATIENT
Start: 2021-05-20 | End: 2021-06-30

## 2021-05-21 NOTE — TELEPHONE ENCOUNTER
Requested Prescriptions     Signed Prescriptions Disp Refills   • ALPRAZolam (XANAX) 0.5 MG Tab 60 tablet 0     Sig: TAKE 1 TABLET BY MOUTH 2 TIMES A DAY AS NEEDED FOR ANXIETY FOR UP TO 30 DAYS.     Authorizing Provider: EFREN IRENE reviewed today    TAMICA Tinoco.

## 2021-06-02 ENCOUNTER — OFFICE VISIT (OUTPATIENT)
Dept: MEDICAL GROUP | Facility: PHYSICIAN GROUP | Age: 31
End: 2021-06-02

## 2021-06-02 VITALS
OXYGEN SATURATION: 100 % | DIASTOLIC BLOOD PRESSURE: 84 MMHG | TEMPERATURE: 97.7 F | SYSTOLIC BLOOD PRESSURE: 130 MMHG | HEIGHT: 63 IN | BODY MASS INDEX: 24.63 KG/M2 | RESPIRATION RATE: 16 BRPM | WEIGHT: 139 LBS | HEART RATE: 81 BPM

## 2021-06-02 DIAGNOSIS — F41.1 GAD (GENERALIZED ANXIETY DISORDER): ICD-10-CM

## 2021-06-02 PROCEDURE — 99213 OFFICE O/P EST LOW 20 MIN: CPT | Performed by: NURSE PRACTITIONER

## 2021-06-02 ASSESSMENT — ANXIETY QUESTIONNAIRES
6. BECOMING EASILY ANNOYED OR IRRITABLE: SEVERAL DAYS
7. FEELING AFRAID AS IF SOMETHING AWFUL MIGHT HAPPEN: SEVERAL DAYS
5. BEING SO RESTLESS THAT IT IS HARD TO SIT STILL: SEVERAL DAYS
2. NOT BEING ABLE TO STOP OR CONTROL WORRYING: MORE THAN HALF THE DAYS
4. TROUBLE RELAXING: MORE THAN HALF THE DAYS
3. WORRYING TOO MUCH ABOUT DIFFERENT THINGS: MORE THAN HALF THE DAYS
1. FEELING NERVOUS, ANXIOUS, OR ON EDGE: MORE THAN HALF THE DAYS
GAD7 TOTAL SCORE: 11

## 2021-06-02 ASSESSMENT — PATIENT HEALTH QUESTIONNAIRE - PHQ9: CLINICAL INTERPRETATION OF PHQ2 SCORE: 0

## 2021-06-02 NOTE — LETTER
[facility]  [address] [facility] [address]     June 2, 2021    Patient: Ramona Acuña   YOB: 1990   Date of Visit: 6/2/2021       To Whom It May Concern:    Ramona Acuañ was seen and treated in our department on 6/2/2021. She is cleared to participate in nursing clinicals.         Sincerely,         TAMICA Tinoco.

## 2021-06-03 NOTE — PROGRESS NOTES
Subjective:     CC: Medication management nursing school clearance    HPI:   Ramona presents today with the following:    ANNA (generalized anxiety disorder)  Chronic medical problem. 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 She continues with Xanax 0.5 mg PRN at least daily. She typically takes half a pill and if the half a pill does not work she will take the extra half to equal 0.5 mg.  The medication does not make her confused, groggy or drowsy.  She is up-to-date on UDS.  She is due for updated controlled substance treatment agreement.  She denies any self-harm or SI today.  Her anna score today is 11.  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      Past Medical History:   Diagnosis Date   • Bronchitis 8/25/2015   • Other plastic surgery for unacceptable cosmetic appearance 2/12/2014       Social History     Tobacco Use   • Smoking status: Never Smoker   • Smokeless tobacco: Never Used   Vaping Use   • Vaping Use: Some days   Substance Use Topics   • Alcohol use: Yes     Comment: 1-2 per month   • Drug use: No       Current Outpatient Medications Ordered in Epic   Medication Sig Dispense Refill   • ALPRAZolam (XANAX) 0.5 MG Tab TAKE 1 TABLET BY MOUTH 2 TIMES A DAY AS NEEDED FOR ANXIETY FOR UP TO 30 DAYS. 60 tablet 0     No current Epic-ordered facility-administered medications on file.       Allergies:  Patient has no known allergies.    Health Maintenance: Completed    ROS:  Gen: no fevers/chills, no changes in weight, denies any self-harm or SI today  Eyes: no changes in vision  Pulm: no shortness of breath  CV: no chest pain, no palpitations  GI: no nausea/vomiting  MSk: no myalgias  Skin: no rash  Neuro: no headaches, no dizziness      Objective:     Vital signs reviewed   Exam:  /84 (BP Location: Right arm, Patient Position: Sitting)   Pulse 81   Temp 36.5 °C (97.7 °F) (Temporal)    "Resp 16   Ht 1.6 m (5' 3\")   Wt 63 kg (139 lb)   SpO2 100%   BMI 24.62 kg/m²  Body mass index is 24.62 kg/m².    Gen: Alert and oriented, No apparent distress.  Neck: Neck is supple without lymphadenopathy.  Lungs: Normal effort, CTA bilaterally, no wheezes, rhonchi, or rales  CV: Regular rate and rhythm. No murmurs, rubs, or gallops.  Ext: No clubbing, cyanosis, edema.      Assessment & Plan:     31 y.o. female with the following -     1. SABA (generalized anxiety disorder)  Chronic stable problem.  She will continue with her alprazolam.  PDMP reviewed today.  Controlled substance treatment agreement updated today.  Discussed and reviewed her saba score today.  We discussed starting daily maintenance medication, she would prefer to keep using the as needed.  She states that she had used medication in the past but it caused her to have depression.  She does not remember the name.  Discussed the patient that if her anxiety worsens in the medication is no longer helping she will need to make an appointment so we can discuss daily maintenance medication.  She verbalized understanding.  She denies any self-harm or SI today.  Red flags discussed.  Note provided today for clearance for clinicals.  Immunization records provided to patient today.  - Controlled Substance Treatment Agreement      Return in about 6 months (around 12/2/2021) for anxiety .    Please note that this dictation was created using voice recognition software. I have made every reasonable attempt to correct obvious errors, but I expect that there are errors of grammar and possibly content that I did not discover before finalizing the note.      "

## 2021-06-03 NOTE — ASSESSMENT & PLAN NOTE
Chronic medical problem. 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 She continues with Xanax 0.5 mg PRN at least daily. She typically takes half a pill and if the half a pill does not work she will take the extra half to equal 0.5 mg.  The medication does not make her confused, groggy or drowsy.  She is up-to-date on UDS.  She is due for updated controlled substance treatment agreement.  She denies any self-harm or SI today.  Her anna score today is 11.  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

## 2021-06-15 ENCOUNTER — TELEPHONE (OUTPATIENT)
Dept: MEDICAL GROUP | Facility: PHYSICIAN GROUP | Age: 31
End: 2021-06-15

## 2021-06-15 NOTE — TELEPHONE ENCOUNTER
VOICEMAIL  1. Caller Name: Ramona Johnson                      Call Back Number: [phone] (home)     2. 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 Ramona asked to return call to get the letter completed.      3. Patient approves office to leave a detailed voicemail/MyChart message: N\A

## 2021-06-15 NOTE — LETTER
[facility]  [address] [address]     June 16, 2021    Patient: Ramona Acuña   YOB: 1990   Date of Visit: 6/15/2021       To Whom It May Concern:    Ramona Acuña was seen and treated in our department on 6/15/2021.     Sincerely,     TAMICA Tinoco.

## 2021-06-15 NOTE — LETTER
[facility]  [address] [facility] [address]     June 16, 2021    Patient: Ramona Acuña   YOB: 1990   Date of Visit: 6/15/2021       To Whom It May Concern:    Ramona Acuña was seen and treated in our department on 6/2/2021. Ramona has no physical limitations that would prevent her from working in the clinical setting.    Sincerely,     TAMICA Tinoco.

## 2021-06-16 NOTE — TELEPHONE ENCOUNTER
Phone Number Called: [phone] (home)     Call outcome: Spoke to patient regarding message below.    Message: The letter needs to say, Ramona has no physical limitations that would prevent her from working in the clinical setting.

## 2021-06-17 ENCOUNTER — NON-PROVIDER VISIT (OUTPATIENT)
Dept: URGENT CARE | Facility: PHYSICIAN GROUP | Age: 31
End: 2021-06-17

## 2021-06-17 DIAGNOSIS — Z11.1 PPD SCREENING TEST: ICD-10-CM

## 2021-06-17 PROCEDURE — 99999 PR NO CHARGE: CPT | Performed by: NURSE PRACTITIONER

## 2021-06-17 PROCEDURE — 86580 TB INTRADERMAL TEST: CPT | Performed by: NURSE PRACTITIONER

## 2021-06-17 NOTE — NON-PROVIDER
Ramona Acuña is a 31 y.o. female here for a non-provider visit for PPD placement -- Step 1 of 2    Reason for PPD:  school requirement    1. TB evaluation questionnaire completed by patient? Yes      -  If any answers marked yes did you contact a provider prior to placing? Not Indicated  2.  Patient notified to return to clinic for reading on: Saturday 6/19/21 at 1252 or before Sunday 6/21/21 at 1252  3.  PPD Placement documentation completed on TB evaluation questionnaire? Yes  4.  Location of TB evaluation questionnaire filed: Front Office.

## 2021-06-20 ENCOUNTER — NON-PROVIDER VISIT (OUTPATIENT)
Dept: URGENT CARE | Facility: PHYSICIAN GROUP | Age: 31
End: 2021-06-20

## 2021-06-20 LAB — TB WHEAL 3D P 5 TU DIAM: 0 MM

## 2021-06-20 NOTE — NON-PROVIDER
Ramona Acuña is a 31 y.o. female here for a non-provider visit for PPD reading -- Step 1 of 2.      1.  Resulted in Epic under enter/edit results? Yes   2.  TB evaluation questionnaire scanned into chart and original given to patient?Yes      3. Was induration greater than 0 mm? No.    If Step 1 of 2, when is patient returning for second step (delete if N/A): 6/24/21    Routed to PCP? Yes

## 2021-06-24 ENCOUNTER — NON-PROVIDER VISIT (OUTPATIENT)
Dept: URGENT CARE | Facility: PHYSICIAN GROUP | Age: 31
End: 2021-06-24

## 2021-06-24 DIAGNOSIS — Z11.1 PPD SCREENING TEST: ICD-10-CM

## 2021-06-24 PROCEDURE — 99999 PR NO CHARGE: CPT | Performed by: PHYSICIAN ASSISTANT

## 2021-06-24 PROCEDURE — 86580 TB INTRADERMAL TEST: CPT | Performed by: PHYSICIAN ASSISTANT

## 2021-06-24 NOTE — NON-PROVIDER
Ramona Acuañ is a 31 y.o. female here for a non-provider visit for PPD placement -- Step 1 of 2    Reason for PPD:  school requirement    1. TB evaluation questionnaire completed by patient? Yes      -  If any answers marked yes did you contact a provider prior to placing? Not Indicated  2.  Patient notified to return to clinic for reading on: Saturday 26 June 2021 at 1045 or before Sunday 27 June 2021 at 1045   3.  PPD Placement documentation completed on TB evaluation questionnaire? Yes  4.  Location of TB evaluation questionnaire filed: Front Office

## 2021-06-26 ENCOUNTER — NON-PROVIDER VISIT (OUTPATIENT)
Dept: URGENT CARE | Facility: PHYSICIAN GROUP | Age: 31
End: 2021-06-26

## 2021-06-26 LAB — TB WHEAL 3D P 5 TU DIAM: 0 MM

## 2021-06-26 NOTE — NON-PROVIDER
Ramona Acuña is a 31 y.o. female here for a non-provider visit for PPD reading -- Step 2 of 2.      1.  Resulted in Epic under enter/edit results? Yes   2.  TB evaluation questionnaire scanned into chart and original given to patient?Yes      3. Was induration greater than 0 mm? No.      Routed to PCP? No

## 2021-06-30 DIAGNOSIS — F41.1 GAD (GENERALIZED ANXIETY DISORDER): ICD-10-CM

## 2021-06-30 RX ORDER — ALPRAZOLAM 0.5 MG/1
TABLET ORAL
Qty: 60 TABLET | Refills: 2 | Status: SHIPPED | OUTPATIENT
Start: 2021-06-30 | End: 2021-10-21

## 2021-06-30 NOTE — TELEPHONE ENCOUNTER
Requested Prescriptions     Signed Prescriptions Disp Refills   • ALPRAZolam (XANAX) 0.5 MG Tab 60 tablet 2     Sig: TAKE 1 TABLET BY MOUTH 2 TIMES A DAY AS NEEDED FOR ANXIETY FOR UP TO 30 DAYS.     Authorizing Provider: EFREN IRENE reviewed today    TAMICA Tinoco.

## 2021-10-21 ENCOUNTER — TELEPHONE (OUTPATIENT)
Dept: MEDICAL GROUP | Facility: PHYSICIAN GROUP | Age: 31
End: 2021-10-21

## 2021-10-21 DIAGNOSIS — F41.1 GAD (GENERALIZED ANXIETY DISORDER): ICD-10-CM

## 2021-10-21 RX ORDER — ALPRAZOLAM 0.5 MG/1
TABLET ORAL
Qty: 60 TABLET | Refills: 0 | Status: SHIPPED | OUTPATIENT
Start: 2021-10-21 | End: 2021-11-20

## 2021-10-22 NOTE — TELEPHONE ENCOUNTER
Requested Prescriptions     Signed Prescriptions Disp Refills   • ALPRAZolam (XANAX) 0.5 MG Tab 60 Tablet 0     Sig: TAKE 1 TABLET BY MOUTH 2 TIMES A DAY AS NEEDED FOR ANXIETY FOR UP TO 30 DAYS.     Authorizing Provider: EFREN IRENE reviewed today. Due for appointment.     TAMICA Tinoco.

## 2021-12-02 ENCOUNTER — OFFICE VISIT (OUTPATIENT)
Dept: MEDICAL GROUP | Facility: PHYSICIAN GROUP | Age: 31
End: 2021-12-02

## 2021-12-02 VITALS
BODY MASS INDEX: 24.8 KG/M2 | DIASTOLIC BLOOD PRESSURE: 72 MMHG | SYSTOLIC BLOOD PRESSURE: 110 MMHG | OXYGEN SATURATION: 99 % | HEART RATE: 79 BPM | TEMPERATURE: 98.2 F | WEIGHT: 140 LBS | HEIGHT: 63 IN

## 2021-12-02 DIAGNOSIS — F41.1 GAD (GENERALIZED ANXIETY DISORDER): ICD-10-CM

## 2021-12-02 DIAGNOSIS — Z79.899 CHRONIC USE OF BENZODIAZEPINE FOR THERAPEUTIC PURPOSE: ICD-10-CM

## 2021-12-02 PROCEDURE — 99213 OFFICE O/P EST LOW 20 MIN: CPT | Performed by: NURSE PRACTITIONER

## 2021-12-02 RX ORDER — ALPRAZOLAM 0.5 MG/1
0.5 TABLET ORAL NIGHTLY PRN
COMMUNITY
End: 2021-12-13

## 2021-12-02 RX ORDER — PROPRANOLOL HYDROCHLORIDE 10 MG/1
10-20 TABLET ORAL
Qty: 30 TABLET | Refills: 1 | Status: SHIPPED | OUTPATIENT
Start: 2021-12-02 | End: 2021-12-14

## 2021-12-02 NOTE — ASSESSMENT & PLAN NOTE
She continues with her alprazolam 0.5 mg as needed. She is up-to-date on her controlled substance treatment agreement.  She is due for UDS today.  Her last dose was this morning. The medication does help with her anxiety. She does take before she has a test. 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 She does not need a medication refill today.

## 2021-12-02 NOTE — PROGRESS NOTES
"Subjective:     CC: anxiety     HPI:   Ramona presents today with the following:     SABA (generalized anxiety disorder)  She continues with her alprazolam 0.5 mg as needed. She is up-to-date on her controlled substance treatment agreement.  She is due for UDS today.  Her last dose was this morning. The medication does help with her anxiety. She does take before she has a test. 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 She is interested in peds/NICU. She does not need a medication refill today.       Past Medical History:   Diagnosis Date   • Bronchitis 8/25/2015   • Other plastic surgery for unacceptable cosmetic appearance 2/12/2014       Social History     Tobacco Use   • Smoking status: Never Smoker   • Smokeless tobacco: Never Used   Vaping Use   • Vaping Use: Some days   Substance Use Topics   • Alcohol use: Yes     Comment: 1-2 per month   • Drug use: No       Current Outpatient Medications Ordered in Epic   Medication Sig Dispense Refill   • ALPRAZolam (XANAX) 0.5 MG Tab Take 0.5 mg by mouth at bedtime as needed for Sleep.     • propranolol (INDERAL) 10 MG Tab Take 1-2 Tablets by mouth 1 time a day as needed (anxiety). Take 30-60 minutes before activity. 30 Tablet 1     No current Epic-ordered facility-administered medications on file.       Allergies:  Patient has no known allergies.    Health Maintenance: Due for UDS and COVID booster. Recommended she complete her COVID booster.     ROS:  Gen: no fevers/chills, no self harm or SI today, + anxiety   Pulm: no cough  CV: no chest pain  GI: no nausea/vomiting  MSk: no myalgias  Skin: no rash  Neuro: no headaches, no numbness/tingling      Objective:     Vital signs reviewed   Exam:  /72 (BP Location: Left arm, Patient Position: Sitting, BP Cuff Size: Adult)   Pulse 79   Temp 36.8 °C (98.2 °F) (Temporal)   Ht 1.6 m (5' 3\")   Wt 63.5 kg (140 lb)   SpO2 99%   BMI 24.80 kg/m²  Body mass index is 24.8 kg/m².    Gen: Alert and oriented, " No apparent distress.  Neck: Neck is supple, full ROM.  Lungs: Normal effort, no audible wheezes  CV: Skin pink, warm and dry.  Ext: No clubbing, cyanosis, edema.      Assessment & Plan:     31 y.o. female with the following -     1. SABA (generalized anxiety disorder)  2. Chronic use of benzodiazepine for therapeutic purpose  Chronic stable problem. With her performance anxiety we discussed starting propranolol. We discussed starting and trialing with a quiz/practice quiz to see if the medication helps.  She is due for UDS but is a self-pay today.  She states that she will get insurance in 1 month with her current employer and is asking if we can wait to do the UDS until then.  I agreed that we can do a UDS at her upcoming appointment in 6 weeks.  - propranolol (INDERAL) 10 MG Tab; Take 1-2 Tablets by mouth 1 time a day as needed (anxiety). Take 30-60 minutes before activity.  Dispense: 30 Tablet; Refill: 1        Return in about 6 weeks (around 1/13/2022) for anxiety, UDS.    Please note that this dictation was created using voice recognition software. I have made every reasonable attempt to correct obvious errors, but I expect that there are errors of grammar and possibly content that I did not discover before finalizing the note.

## 2021-12-10 DIAGNOSIS — F41.1 GAD (GENERALIZED ANXIETY DISORDER): ICD-10-CM

## 2021-12-13 RX ORDER — ALPRAZOLAM 0.5 MG/1
TABLET ORAL
Qty: 60 TABLET | Refills: 0 | Status: SHIPPED | OUTPATIENT
Start: 2021-12-13 | End: 2022-01-12

## 2021-12-13 NOTE — TELEPHONE ENCOUNTER
Requested Prescriptions     Signed Prescriptions Disp Refills   • ALPRAZolam (XANAX) 0.5 MG Tab 60 Tablet 0     Sig: TAKE 1 TABLET BY MOUTH 2 TIMES A DAY AS NEEDED FOR ANXIETY FOR UP TO 30 DAYS.     Authorizing Provider: EFREN IRENE A.P.R.N.

## 2021-12-14 DIAGNOSIS — F41.1 GAD (GENERALIZED ANXIETY DISORDER): ICD-10-CM

## 2021-12-14 RX ORDER — PROPRANOLOL HYDROCHLORIDE 10 MG/1
10-20 TABLET ORAL
Qty: 180 TABLET | Refills: 0 | Status: SHIPPED | OUTPATIENT
Start: 2021-12-14 | End: 2022-02-17

## 2021-12-14 NOTE — TELEPHONE ENCOUNTER
Received request via: Pharmacy    Was the patient seen in the last year in this department? Yes    Does the patient have an active prescription (recently filled or refills available) for medication(s) requested? Pharmacy requesting 90 day supply

## 2021-12-14 NOTE — TELEPHONE ENCOUNTER
Requested Prescriptions     Signed Prescriptions Disp Refills   • propranolol (INDERAL) 10 MG Tab 180 Tablet 0     Sig: TAKE 1-2 TABLETS BY MOUTH 1 TIME A DAY AS NEEDED (ANXIETY). TAKE 30-60 MINUTES BEFORE ACTIVITY.     Authorizing Provider: EFREN IRENE A.P.R.N.

## 2022-01-17 DIAGNOSIS — F41.1 GAD (GENERALIZED ANXIETY DISORDER): ICD-10-CM

## 2022-01-17 RX ORDER — ALPRAZOLAM 0.5 MG/1
TABLET ORAL
Qty: 60 TABLET | Refills: 0 | Status: CANCELLED | OUTPATIENT
Start: 2022-01-17

## 2022-01-17 RX ORDER — ALPRAZOLAM 0.5 MG/1
0.5 TABLET ORAL 2 TIMES DAILY PRN
Qty: 60 TABLET | Refills: 0 | Status: SHIPPED | OUTPATIENT
Start: 2022-01-17 | End: 2022-02-16

## 2022-02-17 ENCOUNTER — OFFICE VISIT (OUTPATIENT)
Dept: MEDICAL GROUP | Facility: PHYSICIAN GROUP | Age: 32
End: 2022-02-17
Payer: COMMERCIAL

## 2022-02-17 VITALS
WEIGHT: 136 LBS | OXYGEN SATURATION: 97 % | TEMPERATURE: 98.3 F | SYSTOLIC BLOOD PRESSURE: 120 MMHG | BODY MASS INDEX: 24.1 KG/M2 | DIASTOLIC BLOOD PRESSURE: 72 MMHG | HEIGHT: 63 IN | HEART RATE: 90 BPM

## 2022-02-17 DIAGNOSIS — F41.1 GAD (GENERALIZED ANXIETY DISORDER): ICD-10-CM

## 2022-02-17 DIAGNOSIS — Z79.899 CHRONIC USE OF BENZODIAZEPINE FOR THERAPEUTIC PURPOSE: ICD-10-CM

## 2022-02-17 PROCEDURE — 99214 OFFICE O/P EST MOD 30 MIN: CPT | Performed by: NURSE PRACTITIONER

## 2022-02-17 RX ORDER — ALPRAZOLAM 0.5 MG/1
0.5 TABLET ORAL NIGHTLY PRN
COMMUNITY

## 2022-02-17 RX ORDER — FLUOXETINE 10 MG/1
10 CAPSULE ORAL DAILY
Qty: 30 CAPSULE | Refills: 2 | Status: SHIPPED | OUTPATIENT
Start: 2022-02-17

## 2022-02-17 ASSESSMENT — ANXIETY QUESTIONNAIRES
1. FEELING NERVOUS, ANXIOUS, OR ON EDGE: MORE THAN HALF THE DAYS
5. BEING SO RESTLESS THAT IT IS HARD TO SIT STILL: SEVERAL DAYS
2. NOT BEING ABLE TO STOP OR CONTROL WORRYING: SEVERAL DAYS
7. FEELING AFRAID AS IF SOMETHING AWFUL MIGHT HAPPEN: SEVERAL DAYS
4. TROUBLE RELAXING: SEVERAL DAYS
GAD7 TOTAL SCORE: 8
3. WORRYING TOO MUCH ABOUT DIFFERENT THINGS: SEVERAL DAYS
6. BECOMING EASILY ANNOYED OR IRRITABLE: SEVERAL DAYS

## 2022-02-17 ASSESSMENT — PATIENT HEALTH QUESTIONNAIRE - PHQ9
CLINICAL INTERPRETATION OF PHQ2 SCORE: 2
SUM OF ALL RESPONSES TO PHQ QUESTIONS 1-9: 6
5. POOR APPETITE OR OVEREATING: 0 - NOT AT ALL

## 2022-02-17 NOTE — ASSESSMENT & PLAN NOTE
"She continues with her alprazolam 0.5 mg as needed. Her last dose was 1/2 pill this morning. She is taking daily in the morning. She is due for UDS today. She tried propranolol. She tried taking before quizzes and did not feel any relief. She does not need a medication refill today. She has been on a SSRI in the past, states believed it started with a \"c\". Her SABA score today is a 8 and her PHQ score today is a 6. Denies any self harm or SI today.   "

## 2022-02-17 NOTE — PROGRESS NOTES
"Subjective:     CC: medication management     HPI:   Ramona presents today with the following:     SABA (generalized anxiety disorder)  She continues with her alprazolam 0.5 mg as needed. Her last dose was 1/2 pill this morning. She is taking daily in the morning. She is due for UDS today. She tried propranolol. She tried taking before quizzes and did not feel any relief. She does not need a medication refill today. She has been on a SSRI in the past, states believed it started with a \"c\". Her SABA score today is a 8 and her PHQ score today is a 6. Denies any self harm or SI today.       Past Medical History:   Diagnosis Date   • Bronchitis 8/25/2015   • Other plastic surgery for unacceptable cosmetic appearance 2/12/2014       Social History     Tobacco Use   • Smoking status: Never Smoker   • Smokeless tobacco: Never Used   Vaping Use   • Vaping Use: Some days   Substance Use Topics   • Alcohol use: Yes     Comment: 1-2 per month   • Drug use: No       Current Outpatient Medications Ordered in Epic   Medication Sig Dispense Refill   • ALPRAZolam (XANAX) 0.5 MG Tab Take 0.5 mg by mouth at bedtime as needed for Sleep.     • FLUoxetine (PROZAC) 10 MG Cap Take 1 Capsule by mouth every day. 30 Capsule 2     No current Epic-ordered facility-administered medications on file.       Allergies:  Patient has no known allergies.    Health Maintenance: Due for COVID booster       Objective:     Vital signs reviewed   Exam:  /72 (BP Location: Left arm, Patient Position: Sitting, BP Cuff Size: Adult)   Pulse 90   Temp 36.8 °C (98.3 °F) (Temporal)   Ht 1.6 m (5' 3\")   Wt 61.7 kg (136 lb)   SpO2 97%   BMI 24.09 kg/m²  Body mass index is 24.09 kg/m².    Gen: Alert and oriented, No apparent distress.  Neck: Neck is supple without lymphadenopathy.  Lungs: Normal effort, CTA bilaterally, no wheezes, rhonchi, or rales  CV: Regular rate and rhythm. No murmurs, rubs, or gallops.  Ext: No clubbing, cyanosis, " edema.      Assessment & Plan:     32 y.o. female with the following -     1. SABA (generalized anxiety disorder)  Chronic exacerbated problem.  She is having to take alprazolam daily, even half a tablet.  We discussed that if she is needing her alprazolam daily she may benefit from daily SSRI treatment.  We discussed starting fluoxetine, she states that she has not tried this before.  We discussed medication and its side effects. She is in agreement to start with fluoxetine 10 mg daily.  Discussed reviewed her SABA and PHQ score.  She denies any self-harm or SI today.  She will return in 1 month for follow-up. UDS collected today in office.   - Pain Management Screen; Future  - FLUoxetine (PROZAC) 10 MG Cap; Take 1 Capsule by mouth every day.  Dispense: 30 Capsule; Refill: 2  - Controlled Substance Treatment Agreement    2. Chronic use of benzodiazepine for therapeutic purpose  Chronic stable problem. Due for UDS and will update her controlled substance treatment agreement today.  She does not need a medication refill today on her present line.   reviewed today.  - Pain Management Screen; Future  - Controlled Substance Treatment Agreement      Return in about 4 weeks (around 3/17/2022) for medication management.    Please note that this dictation was created using voice recognition software. I have made every reasonable attempt to correct obvious errors, but I expect that there are errors of grammar and possibly content that I did not discover before finalizing the note.

## 2022-05-06 ENCOUNTER — HOSPITAL ENCOUNTER (OUTPATIENT)
Facility: MEDICAL CENTER | Age: 32
End: 2022-05-06
Attending: FAMILY MEDICINE

## 2022-05-06 ENCOUNTER — OFFICE VISIT (OUTPATIENT)
Dept: URGENT CARE | Facility: CLINIC | Age: 32
End: 2022-05-06

## 2022-05-06 VITALS
SYSTOLIC BLOOD PRESSURE: 122 MMHG | WEIGHT: 132.4 LBS | TEMPERATURE: 98.6 F | BODY MASS INDEX: 23.46 KG/M2 | OXYGEN SATURATION: 100 % | HEART RATE: 78 BPM | RESPIRATION RATE: 16 BRPM | HEIGHT: 63 IN | DIASTOLIC BLOOD PRESSURE: 80 MMHG

## 2022-05-06 DIAGNOSIS — L98.9 SKIN LESION: ICD-10-CM

## 2022-05-06 PROCEDURE — 87529 HSV DNA AMP PROBE: CPT | Mod: 91

## 2022-05-06 PROCEDURE — 99213 OFFICE O/P EST LOW 20 MIN: CPT | Performed by: FAMILY MEDICINE

## 2022-05-06 RX ORDER — PROPRANOLOL HYDROCHLORIDE 10 MG/1
TABLET ORAL
COMMUNITY
Start: 2022-03-31

## 2022-05-06 NOTE — PROGRESS NOTES
"  Subjective:      32 y.o. female presents to urgent care for \"bumps\" just below her left butt cheek that started on Monday.  It came out of nowhere, and was progressively worsening in terms of pain, however this morning it seems to be improving.  Pain is currently rated 5/10.  She did use some ice and ibuprofen which has been helping.  No open areas or discharge.  She has had no new sexual partners, but states her and her boyfriend were recently on a break and he had a new sexual partner recently.  She does have a history of cold sores, none currently.  She remains afebrile.    She denies any other questions or concerns at this time.    Current problem list, medication, and past medical/surgical history were reviewed in Epic.    ROS  See HPI     Objective:      /80 (BP Location: Left arm, Patient Position: Sitting, BP Cuff Size: Adult)   Pulse 78   Temp 37 °C (98.6 °F) (Temporal)   Resp 16   Ht 1.6 m (5' 3\")   Wt 60.1 kg (132 lb 6.4 oz)   SpO2 100%   BMI 23.45 kg/m²     Physical Exam  Constitutional:       General: She is not in acute distress.     Appearance: She is not diaphoretic.   Cardiovascular:      Rate and Rhythm: Normal rate and regular rhythm.      Heart sounds: Normal heart sounds.   Pulmonary:      Effort: Pulmonary effort is normal. No respiratory distress.      Breath sounds: Normal breath sounds.   Skin:     Comments: Cluster of 5 lesions just below her left buttocks, no umbilication, no open lesions, no discharge, no surrounding skin changes or erythema.   Neurological:      Mental Status: She is alert.   Psychiatric:         Mood and Affect: Affect normal.         Judgment: Judgment normal.       Assessment/Plan:     1. Skin lesion  Most consistent with herpes in appearance, swab has been sent.  Tylenol and ibuprofen as needed for symptomatic relief.  - HSV 1/2 PCR      Instructed to return to Urgent Care or nearest Emergency Department if symptoms fail to improve, for any change in " condition, further concerns, or new concerning symptoms. Patient states understanding of the plan of care and discharge instructions.    Rola Hernandez M.D.

## 2022-05-12 LAB
HSV DNA SPEC QL NAA+PROBE: DETECTED
SPECIMEN SOURCE: ABNORMAL

## 2022-05-15 LAB
HSV1 DNA CSF QL NAA+PROBE: NOT DETECTED
HSV2 DNA CSF QL NAA+PROBE: DETECTED
SPECIMEN SOURCE: ABNORMAL

## 2022-06-30 ENCOUNTER — APPOINTMENT (OUTPATIENT)
Dept: URGENT CARE | Facility: PHYSICIAN GROUP | Age: 32
End: 2022-06-30

## 2023-01-19 ENCOUNTER — EH NON-PROVIDER (OUTPATIENT)
Dept: OCCUPATIONAL MEDICINE | Facility: CLINIC | Age: 33
End: 2023-01-19

## 2023-01-19 ENCOUNTER — EMPLOYEE HEALTH (OUTPATIENT)
Dept: OCCUPATIONAL MEDICINE | Facility: CLINIC | Age: 33
End: 2023-01-19

## 2023-01-19 ENCOUNTER — HOSPITAL ENCOUNTER (OUTPATIENT)
Facility: MEDICAL CENTER | Age: 33
End: 2023-01-19
Attending: PREVENTIVE MEDICINE
Payer: COMMERCIAL

## 2023-01-19 DIAGNOSIS — Z02.89 VISIT FOR OCCUPATIONAL HEALTH EXAMINATION: Primary | ICD-10-CM

## 2023-01-19 DIAGNOSIS — Z02.89 VISIT FOR OCCUPATIONAL HEALTH EXAMINATION: ICD-10-CM

## 2023-01-19 LAB
AMP AMPHETAMINE: NORMAL
BAR BARBITURATES: NORMAL
BZO BENZODIAZEPINES: NORMAL
COC COCAINE: NORMAL
INT CON NEG: NORMAL
INT CON POS: NORMAL
MDMA ECSTASY: NORMAL
MET METHAMPHETAMINES: NORMAL
MTD METHADONE: NORMAL
OPI OPIATES: NORMAL
OXY OXYCODONE: NORMAL
PCP PHENCYCLIDINE: NORMAL
POC URINE DRUG SCREEN OCDRS: NORMAL
THC: NORMAL

## 2023-01-19 PROCEDURE — 80305 DRUG TEST PRSMV DIR OPT OBS: CPT | Performed by: PREVENTIVE MEDICINE

## 2023-01-19 PROCEDURE — 86762 RUBELLA ANTIBODY: CPT | Performed by: NURSE PRACTITIONER

## 2023-01-19 PROCEDURE — 94375 RESPIRATORY FLOW VOLUME LOOP: CPT | Performed by: PREVENTIVE MEDICINE

## 2023-01-19 PROCEDURE — 8915 PR COMPREHENSIVE PHYSICAL: Performed by: PREVENTIVE MEDICINE

## 2023-01-19 PROCEDURE — 86735 MUMPS ANTIBODY: CPT | Performed by: NURSE PRACTITIONER

## 2023-01-19 PROCEDURE — 86765 RUBEOLA ANTIBODY: CPT | Performed by: NURSE PRACTITIONER

## 2023-01-21 LAB
MEV IGG SER-ACNC: >300 AU/ML
MUV IGG SER IA-ACNC: 56.8 AU/ML
RUBV AB SER QL: 415 IU/ML

## 2023-01-26 ENCOUNTER — TELEPHONE (OUTPATIENT)
Dept: OCCUPATIONAL MEDICINE | Facility: CLINIC | Age: 33
End: 2023-01-26
Payer: COMMERCIAL

## 2023-01-26 NOTE — TELEPHONE ENCOUNTER
Called patient to come in for recollect on her quantiferon lab draw. Patient answered but was unable to hear me and call was disconnected. Called back and forwarded my call to . Unable to leave a detailed message at this time. Will

## 2023-03-10 ENCOUNTER — EH NON-PROVIDER (OUTPATIENT)
Dept: OCCUPATIONAL MEDICINE | Facility: CLINIC | Age: 33
End: 2023-03-10

## 2023-03-10 ENCOUNTER — HOSPITAL ENCOUNTER (OUTPATIENT)
Facility: MEDICAL CENTER | Age: 33
End: 2023-03-10
Attending: PREVENTIVE MEDICINE
Payer: COMMERCIAL

## 2023-03-10 DIAGNOSIS — Z02.89 ENCOUNTER FOR OCCUPATIONAL HEALTH ASSESSMENT: ICD-10-CM

## 2023-03-10 DIAGNOSIS — Z02.89 ENCOUNTER FOR OCCUPATIONAL HEALTH ASSESSMENT: Primary | ICD-10-CM

## 2023-03-10 PROCEDURE — 86480 TB TEST CELL IMMUN MEASURE: CPT | Performed by: PREVENTIVE MEDICINE

## 2023-03-13 LAB
GAMMA INTERFERON BACKGROUND BLD IA-ACNC: 0.05 IU/ML
M TB IFN-G BLD-IMP: NEGATIVE
M TB IFN-G CD4+ BCKGRND COR BLD-ACNC: 0.01 IU/ML
MITOGEN IGNF BCKGRD COR BLD-ACNC: >10 IU/ML
QFT TB2 - NIL TBQ2: 0.01 IU/ML

## 2023-03-15 ENCOUNTER — EH NON-PROVIDER (OUTPATIENT)
Dept: OCCUPATIONAL MEDICINE | Facility: CLINIC | Age: 33
End: 2023-03-15

## 2023-10-14 ENCOUNTER — IMMUNIZATION (OUTPATIENT)
Dept: OCCUPATIONAL MEDICINE | Facility: CLINIC | Age: 33
End: 2023-10-14

## 2023-10-14 DIAGNOSIS — Z23 NEED FOR VACCINATION: Primary | ICD-10-CM

## 2023-10-14 PROCEDURE — 90686 IIV4 VACC NO PRSV 0.5 ML IM: CPT | Performed by: PREVENTIVE MEDICINE

## 2024-01-31 ENCOUNTER — APPOINTMENT (OUTPATIENT)
Dept: RADIOLOGY | Facility: MEDICAL CENTER | Age: 34
End: 2024-01-31
Attending: EMERGENCY MEDICINE
Payer: COMMERCIAL

## 2024-01-31 ENCOUNTER — HOSPITAL ENCOUNTER (EMERGENCY)
Facility: MEDICAL CENTER | Age: 34
End: 2024-01-31
Attending: EMERGENCY MEDICINE
Payer: COMMERCIAL

## 2024-01-31 VITALS
WEIGHT: 113.1 LBS | RESPIRATION RATE: 18 BRPM | DIASTOLIC BLOOD PRESSURE: 88 MMHG | SYSTOLIC BLOOD PRESSURE: 130 MMHG | HEART RATE: 97 BPM | BODY MASS INDEX: 20.04 KG/M2 | TEMPERATURE: 98 F | HEIGHT: 63 IN | OXYGEN SATURATION: 100 %
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
## 2024-01-31 DIAGNOSIS — S00.83XA CONTUSION OF FACE, INITIAL ENCOUNTER: ICD-10-CM

## 2024-01-31 DIAGNOSIS — S16.1XXA STRAIN OF NECK MUSCLE, INITIAL ENCOUNTER: ICD-10-CM

## 2024-01-31 LAB — HCG UR QL: NEGATIVE

## 2024-01-31 PROCEDURE — 99284 EMERGENCY DEPT VISIT MOD MDM: CPT

## 2024-01-31 PROCEDURE — 72125 CT NECK SPINE W/O DYE: CPT

## 2024-01-31 PROCEDURE — 81025 URINE PREGNANCY TEST: CPT

## 2024-01-31 PROCEDURE — 700111 HCHG RX REV CODE 636 W/ 250 OVERRIDE (IP): Mod: JZ | Performed by: EMERGENCY MEDICINE

## 2024-01-31 PROCEDURE — 70450 CT HEAD/BRAIN W/O DYE: CPT

## 2024-01-31 PROCEDURE — 96372 THER/PROPH/DIAG INJ SC/IM: CPT

## 2024-01-31 PROCEDURE — 70486 CT MAXILLOFACIAL W/O DYE: CPT

## 2024-01-31 RX ORDER — ONDANSETRON 4 MG/1
4 TABLET, ORALLY DISINTEGRATING ORAL ONCE
Status: COMPLETED | OUTPATIENT
Start: 2024-01-31 | End: 2024-01-31

## 2024-01-31 RX ORDER — CYCLOBENZAPRINE HCL 5 MG
5-10 TABLET ORAL 3 TIMES DAILY PRN
Qty: 10 TABLET | Refills: 0 | Status: SHIPPED | OUTPATIENT
Start: 2024-01-31 | End: 2024-02-02

## 2024-01-31 RX ORDER — ONDANSETRON 4 MG/1
4 TABLET, ORALLY DISINTEGRATING ORAL EVERY 6 HOURS PRN
Qty: 10 TABLET | Refills: 0 | Status: SHIPPED | OUTPATIENT
Start: 2024-01-31

## 2024-01-31 RX ADMIN — ONDANSETRON 4 MG: 4 TABLET, ORALLY DISINTEGRATING ORAL at 13:01

## 2024-01-31 RX ADMIN — FENTANYL CITRATE 50 MCG: 50 INJECTION, SOLUTION INTRAMUSCULAR; INTRAVENOUS at 13:01

## 2024-01-31 NOTE — ED NOTES
Discharge instructions provided. Pt verbalized understanding of discharge instructions to follow up with occupational health and to return to ER if condition worsens. Pt ambulated out of ER without difficulty.
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
## 2024-04-19 ENCOUNTER — HOSPITAL ENCOUNTER (OUTPATIENT)
Dept: LAB | Facility: MEDICAL CENTER | Age: 34
End: 2024-04-19
Attending: FAMILY MEDICINE

## 2024-04-19 ENCOUNTER — OFFICE VISIT (OUTPATIENT)
Dept: URGENT CARE | Facility: PHYSICIAN GROUP | Age: 34
End: 2024-04-19

## 2024-04-19 VITALS
TEMPERATURE: 98.2 F | SYSTOLIC BLOOD PRESSURE: 128 MMHG | RESPIRATION RATE: 20 BRPM | HEART RATE: 99 BPM | WEIGHT: 107.14 LBS | HEIGHT: 63 IN | OXYGEN SATURATION: 98 % | BODY MASS INDEX: 18.98 KG/M2 | DIASTOLIC BLOOD PRESSURE: 74 MMHG

## 2024-04-19 DIAGNOSIS — R63.4 WEIGHT LOSS: ICD-10-CM

## 2024-04-19 DIAGNOSIS — R11.2 NAUSEA AND VOMITING, UNSPECIFIED VOMITING TYPE: ICD-10-CM

## 2024-04-19 DIAGNOSIS — F41.9 ANXIETY: ICD-10-CM

## 2024-04-19 LAB
ALBUMIN SERPL BCP-MCNC: 4.8 G/DL (ref 3.2–4.9)
ALBUMIN/GLOB SERPL: 1.8 G/DL
ALP SERPL-CCNC: 94 U/L (ref 30–99)
ALT SERPL-CCNC: 110 U/L (ref 2–50)
ANION GAP SERPL CALC-SCNC: 20 MMOL/L (ref 7–16)
AST SERPL-CCNC: 198 U/L (ref 12–45)
BASOPHILS # BLD AUTO: 1.4 % (ref 0–1.8)
BASOPHILS # BLD: 0.06 K/UL (ref 0–0.12)
BILIRUB SERPL-MCNC: 2.2 MG/DL (ref 0.1–1.5)
BUN SERPL-MCNC: 6 MG/DL (ref 8–22)
CALCIUM ALBUM COR SERPL-MCNC: 9.2 MG/DL (ref 8.5–10.5)
CALCIUM SERPL-MCNC: 9.8 MG/DL (ref 8.5–10.5)
CHLORIDE SERPL-SCNC: 91 MMOL/L (ref 96–112)
CO2 SERPL-SCNC: 23 MMOL/L (ref 20–33)
CREAT SERPL-MCNC: 0.52 MG/DL (ref 0.5–1.4)
EOSINOPHIL # BLD AUTO: 0.03 K/UL (ref 0–0.51)
EOSINOPHIL NFR BLD: 0.7 % (ref 0–6.9)
ERYTHROCYTE [DISTWIDTH] IN BLOOD BY AUTOMATED COUNT: 54.2 FL (ref 35.9–50)
GFR SERPLBLD CREATININE-BSD FMLA CKD-EPI: 123 ML/MIN/1.73 M 2
GLOBULIN SER CALC-MCNC: 2.7 G/DL (ref 1.9–3.5)
GLUCOSE SERPL-MCNC: 89 MG/DL (ref 65–99)
HCT VFR BLD AUTO: 40.9 % (ref 37–47)
HGB BLD-MCNC: 13.4 G/DL (ref 12–16)
IMM GRANULOCYTES # BLD AUTO: 0.01 K/UL (ref 0–0.11)
IMM GRANULOCYTES NFR BLD AUTO: 0.2 % (ref 0–0.9)
LYMPHOCYTES # BLD AUTO: 1.32 K/UL (ref 1–4.8)
LYMPHOCYTES NFR BLD: 30.2 % (ref 22–41)
MCH RBC QN AUTO: 31.8 PG (ref 27–33)
MCHC RBC AUTO-ENTMCNC: 32.8 G/DL (ref 32.2–35.5)
MCV RBC AUTO: 96.9 FL (ref 81.4–97.8)
MONOCYTES # BLD AUTO: 0.29 K/UL (ref 0–0.85)
MONOCYTES NFR BLD AUTO: 6.6 % (ref 0–13.4)
NEUTROPHILS # BLD AUTO: 2.66 K/UL (ref 1.82–7.42)
NEUTROPHILS NFR BLD: 60.9 % (ref 44–72)
NRBC # BLD AUTO: 0 K/UL
NRBC BLD-RTO: 0 /100 WBC (ref 0–0.2)
PLATELET # BLD AUTO: 74 K/UL (ref 164–446)
PLATELETS.RETICULATED NFR BLD AUTO: 8.7 % (ref 0.6–13.1)
PMV BLD AUTO: 11.3 FL (ref 9–12.9)
POTASSIUM SERPL-SCNC: 3.7 MMOL/L (ref 3.6–5.5)
PROT SERPL-MCNC: 7.5 G/DL (ref 6–8.2)
RBC # BLD AUTO: 4.22 M/UL (ref 4.2–5.4)
SODIUM SERPL-SCNC: 134 MMOL/L (ref 135–145)
T4 FREE SERPL-MCNC: 1.26 NG/DL (ref 0.93–1.7)
TSH SERPL DL<=0.005 MIU/L-ACNC: 4.1 UIU/ML (ref 0.38–5.33)
WBC # BLD AUTO: 4.4 K/UL (ref 4.8–10.8)

## 2024-04-19 PROCEDURE — 3078F DIAST BP <80 MM HG: CPT | Performed by: FAMILY MEDICINE

## 2024-04-19 PROCEDURE — 80053 COMPREHEN METABOLIC PANEL: CPT

## 2024-04-19 PROCEDURE — 36415 COLL VENOUS BLD VENIPUNCTURE: CPT

## 2024-04-19 PROCEDURE — 3074F SYST BP LT 130 MM HG: CPT | Performed by: FAMILY MEDICINE

## 2024-04-19 PROCEDURE — 99213 OFFICE O/P EST LOW 20 MIN: CPT | Performed by: FAMILY MEDICINE

## 2024-04-19 PROCEDURE — 84443 ASSAY THYROID STIM HORMONE: CPT

## 2024-04-19 PROCEDURE — 84439 ASSAY OF FREE THYROXINE: CPT

## 2024-04-19 PROCEDURE — 85055 RETICULATED PLATELET ASSAY: CPT

## 2024-04-19 PROCEDURE — 85025 COMPLETE CBC W/AUTO DIFF WBC: CPT

## 2024-04-19 RX ORDER — ONDANSETRON 4 MG/1
4 TABLET, FILM COATED ORAL EVERY 4 HOURS PRN
Qty: 20 TABLET | Refills: 0 | Status: SHIPPED | OUTPATIENT
Start: 2024-04-19

## 2024-04-19 ASSESSMENT — ENCOUNTER SYMPTOMS
EYES NEGATIVE: 1
DIZZINESS: 1
VOMITING: 1
RESPIRATORY NEGATIVE: 1
TREMORS: 1
NERVOUS/ANXIOUS: 1
NAUSEA: 1
WEAKNESS: 1
MUSCULOSKELETAL NEGATIVE: 1
CARDIOVASCULAR NEGATIVE: 1
DIARRHEA: 0
WEIGHT LOSS: 1

## 2024-04-19 NOTE — PROGRESS NOTES
"Subjective:   Ramona Acuña is a 34 y.o. female who presents for Emesis (Dry heaving/dizziness; nausea x, shaky x months getting worse last few days   Had concussion in Feb feels sick since )      HPI    Review of Systems   Constitutional:  Positive for weight loss.   HENT: Negative.     Eyes: Negative.    Respiratory: Negative.     Cardiovascular: Negative.    Gastrointestinal:  Positive for nausea and vomiting. Negative for diarrhea.   Genitourinary: Negative.    Musculoskeletal: Negative.    Skin: Negative.    Neurological:  Positive for dizziness, tremors and weakness.   Psychiatric/Behavioral:  The patient is nervous/anxious.        Medications, Allergies, and current problem list reviewed today in Epic.     Objective:     /74   Pulse 99   Temp 36.8 °C (98.2 °F) (Temporal)   Resp 20   Ht 1.6 m (5' 3\")   Wt 48.6 kg (107 lb 2.3 oz)   SpO2 98%     Physical Exam  Vitals and nursing note reviewed.   HENT:      Head: Normocephalic and atraumatic.      Right Ear: Tympanic membrane normal.      Left Ear: Tympanic membrane normal.      Nose: Nose normal.      Mouth/Throat:      Pharynx: Oropharynx is clear.   Eyes:      Extraocular Movements: Extraocular movements intact.      Pupils: Pupils are equal, round, and reactive to light.   Cardiovascular:      Rate and Rhythm: Normal rate and regular rhythm.      Pulses: Normal pulses.      Heart sounds: Normal heart sounds.   Pulmonary:      Effort: Pulmonary effort is normal.      Breath sounds: Normal breath sounds.   Abdominal:      General: Abdomen is flat. Bowel sounds are normal.      Palpations: Abdomen is soft.   Musculoskeletal:      Cervical back: Normal range of motion and neck supple.   Skin:     General: Skin is warm.   Neurological:      General: No focal deficit present.      Mental Status: She is oriented to person, place, and time. Mental status is at baseline.         Assessment/Plan:     Diagnosis and associated orders:     1. Anxiety "        2. Weight loss        3. Nausea and vomiting, unspecified vomiting type  ondansetron (ZOFRAN) 4 MG Tab tablet         Comments/MDM:              Differential diagnosis, natural history, supportive care, and indications for immediate follow-up discussed.    Advised the patient to follow-up with the primary care physician for recheck, reevaluation, and consideration of further management.    Please note that this dictation was created using voice recognition software. I have made a reasonable attempt to correct obvious errors, but I expect that there are errors of grammar and possibly content that I did not discover before finalizing the note.    This note was electronically signed by Nathen Mishra M.D.

## 2024-04-22 ENCOUNTER — OFFICE VISIT (OUTPATIENT)
Dept: MEDICAL GROUP | Facility: PHYSICIAN GROUP | Age: 34
End: 2024-04-22

## 2024-04-22 VITALS
TEMPERATURE: 98.7 F | BODY MASS INDEX: 19.67 KG/M2 | HEIGHT: 63 IN | SYSTOLIC BLOOD PRESSURE: 94 MMHG | HEART RATE: 98 BPM | DIASTOLIC BLOOD PRESSURE: 60 MMHG | WEIGHT: 111 LBS | OXYGEN SATURATION: 99 %

## 2024-04-22 DIAGNOSIS — D69.6 THROMBOCYTOPENIA (HCC): ICD-10-CM

## 2024-04-22 DIAGNOSIS — R74.01 ELEVATED AST (SGOT): ICD-10-CM

## 2024-04-22 DIAGNOSIS — R74.01 ELEVATED ALT MEASUREMENT: ICD-10-CM

## 2024-04-22 DIAGNOSIS — Z00.00 ANNUAL VISIT FOR GENERAL ADULT MEDICAL EXAMINATION WITHOUT ABNORMAL FINDINGS: ICD-10-CM

## 2024-04-22 PROBLEM — Z79.899 CHRONIC USE OF BENZODIAZEPINE FOR THERAPEUTIC PURPOSE: Status: RESOLVED | Noted: 2021-12-02 | Resolved: 2024-04-22

## 2024-04-22 PROCEDURE — 3074F SYST BP LT 130 MM HG: CPT | Performed by: NURSE PRACTITIONER

## 2024-04-22 PROCEDURE — 99395 PREV VISIT EST AGE 18-39: CPT | Performed by: NURSE PRACTITIONER

## 2024-04-22 PROCEDURE — 3078F DIAST BP <80 MM HG: CPT | Performed by: NURSE PRACTITIONER

## 2024-04-22 ASSESSMENT — PATIENT HEALTH QUESTIONNAIRE - PHQ9: CLINICAL INTERPRETATION OF PHQ2 SCORE: 0

## 2024-04-22 ASSESSMENT — FIBROSIS 4 INDEX: FIB4 SCORE: 9.18

## 2024-04-22 NOTE — ASSESSMENT & PLAN NOTE
Labs from urgent care showed a platelet count of 74.  She has noticed bloody noses but feels related to the dry weather.  She has chronic easy bruising.  She was recently at [facility] 1 day ago and reports that they did do labs.  We are requesting updated records before we reorder labs.

## 2024-04-22 NOTE — PROGRESS NOTES
Subjective:     CC:   Chief Complaint   Patient presents with    Lab Results     [facility] urgent     Annual Exam       HPI:   Ramona Acuña is a 34 y.o. female who presents for annual exam. She is feeling well and denies any complaints.    Thrombocytopenia (HCC)  Labs from urgent care showed a platelet count of 74.  She has noticed bloody noses but feels related to the dry weather.  She has chronic easy bruising.  She was recently at [facility] ER 1 day ago and reports that they did do labs.  We are requesting updated records before we reorder labs.    Elevated ALT measurement  Labs from urgent care showed elevated ALT.  She is not having any right upper quadrant pain or jaundice.  Does have nausea and vomiting.  She does drink alcohol 1-2 drinks a week.  With her thrombocytopenia checking ultrasound of her abdomen.    Elevated AST (SGOT)  Elevated lab from urgent care.  Ordering abdominal ultrasound.      Ob-Gyn/ History:    Patient has GYN provider: yes. [facility]  /Para:  2/2  Last Pap Smear:  18 months ago. No history of abnormal pap smears.  Gyn Surgery:   x 2, mammoplasty.  Current Contraceptive Method:  none. She is currently sexually active.  Last menstrual period:  2024.  Periods regular. light, heavy bleeding. Cramping is moderate.   She does take OTC analgesics for cramps.  No significant bloating/fluid retention, pelvic pain, or dyspareunia. No vaginal discharge.  Urinary incontinence: none  Folate intake: yes     Health Maintenance  Diet: She was having nausea and started zofran. Lately since stress is down, eating more fruits, vegetables, some carbohydrates, some meat, snacks more, occ fast food, no soda or energy drinks.   Exercise: She is walking, playing with kids. She is getting 30 minutes a day.   Substance Abuse: Discussed and reviewed   Safe in relationship.   Seat belts safety discussed.  Sun protection use encouraged.    Cancer screening  Colorectal  Cancer Screening: n/a    Lung Cancer Screening: n/a    Cervical Cancer Screening: requesting records   Breast Cancer Screening: n/a     Infectious disease screening/Immunizations  --STI Screening: no   --Practices safe sex.  --HIV Screening: declines   --Hepatitis C Screening: declines   --Immunizations:    Influenza: 10/14/2023    Tetanus: 2/10/2020    Shingles: n/a    Pneumococcal : n/a     Other immunizations: declines COVID booster     She  has a past medical history of Bronchitis (8/25/2015) and Other plastic surgery for unacceptable cosmetic appearance (2/12/2014).  She  has a past surgical history that includes other surgical procedure (Right, 2000; 2004); septoplasty (2007); mammoplasty augmentation (2/12/2014); mastopexy (2/12/2014); and primary c section (10/17/09;10/29/12).    Family History   Problem Relation Age of Onset    No Known Problems Mother     No Known Problems Father     No Known Problems Maternal Grandmother     No Known Problems Maternal Grandfather     No Known Problems Paternal Grandmother     Cancer Paternal Grandfather         colon    Heart Disease Paternal Grandfather 65        unknown type of heart disease    No Known Problems Son     No Known Problems Son        Social History     Socioeconomic History    Marital status:      Spouse name: Not on file    Number of children: Not on file    Years of education: Not on file    Highest education level: Not on file   Occupational History    Not on file   Tobacco Use    Smoking status: Never    Smokeless tobacco: Never   Vaping Use    Vaping Use: Every day    Substances: Nicotine, Flavoring    Devices: Disposable   Substance and Sexual Activity    Alcohol use: Yes     Comment: 1-2 per week    Drug use: No    Sexual activity: Yes     Partners: Male     Birth control/protection: None   Other Topics Concern    Not on file   Social History Narrative    Not on file     Social Determinants of Health     Financial Resource Strain: Not on  "file   Food Insecurity: Not on file   Transportation Needs: Not on file   Physical Activity: Not on file   Stress: Not on file   Social Connections: Not on file   Intimate Partner Violence: Not on file   Housing Stability: Not on file       Patient Active Problem List    Diagnosis Date Noted    Elevated AST (SGOT) 04/22/2024    Elevated ALT measurement 04/22/2024    Thrombocytopenia (HCC) 04/22/2024    Intermittent lightheadedness 11/06/2020    SABA (generalized anxiety disorder) 08/31/2020         Current Outpatient Medications   Medication Sig Dispense Refill    QUEtiapine Fumarate (SEROQUEL PO) Take  by mouth. Pt doesn't know exact dose.      FOLIC ACID PO Take  by mouth. Pt doesn't know exact dose.      Multiple Vitamins-Minerals (MULTIVITAL PO) Take  by mouth.      ondansetron (ZOFRAN) 4 MG Tab tablet Take 1 Tablet by mouth every four hours as needed for Nausea/Vomiting. (Patient not taking: Reported on 4/22/2024) 20 Tablet 0     No current facility-administered medications for this visit.     No Known Allergies    Review of Systems   Constitutional: Negative for fever, chills and malaise/fatigue.   HENT: Negative for congestion.    Eyes: Negative for pain.   Respiratory: Negative for cough and shortness of breath.    Cardiovascular: Negative for leg swelling.   Gastrointestinal: Negative for vomiting, abdominal pain and diarrhea.  Positive for nausea.  Genitourinary: Negative for dysuria and hematuria.   Skin: Negative for rash.   Neurological: Negative for dizziness, focal weakness and headaches.   Endo/Heme/Allergies: Does not bruise/bleed easily.   Psychiatric/Behavioral: Negative for depression.  The patient is not nervous/anxious.      Objective:     Vital signs reviewed   BP 94/60 (BP Location: Right arm, Patient Position: Sitting, BP Cuff Size: Adult)   Pulse 98   Temp 37.1 °C (98.7 °F) (Temporal)   Ht 1.6 m (5' 3\")   Wt 50.3 kg (111 lb)   LMP 04/19/2024   SpO2 99%   BMI 19.66 kg/m²   Body mass " index is 19.66 kg/m².  Wt Readings from Last 4 Encounters:   04/22/24 50.3 kg (111 lb)   04/19/24 48.6 kg (107 lb 2.3 oz)   01/31/24 51.3 kg (113 lb 1.5 oz)   05/06/22 60.1 kg (132 lb 6.4 oz)       Physical Exam:  Constitutional: Well-developed and well-nourished. Not diaphoretic. No distress.   Skin: Skin is warm and dry. No rash noted.  Head: Atraumatic without lesions.  Eyes: Conjunctivae and extraocular motions are normal. Pupils are equal, round, and reactive to light. No scleral icterus.   Ears:  External ears unremarkable. Tympanic membranes clear and intact.  Nose: Nares patent. Septum midline. Turbinates without erythema nor edema. No discharge.   Mouth/Throat: Dentition is intact. Tongue normal. Oropharynx is clear and moist. Posterior pharynx without erythema or exudates.  Neck: Supple, trachea midline. Normal range of motion. No lymphadenopathy--cervical or supraclavicular.  Cardiovascular: Regular rate and rhythm, S1 and S2 without murmur, rubs, or gallops.  Lungs: Normal inspiratory effort, CTA bilaterally, no wheezes/rhonchi/rales  Abdomen: Soft, non tender, and without distention. Active bowel sounds in all four quadrants. No rebound, guarding, masses or HSM.  Extremities: No cyanosis, clubbing, erythema, nor edema  Musculoskeletal: All major joints AROM full in all directions without pain.  UE strength equal 5/5, LE strength equal 5/5,  strength equal 5/5  Neurological: Alert and oriented x 3.  CN II-XII intact.  Psychiatric:  Behavior, mood, and affect are appropriate.        Assessment and Plan:     Discussed and reviewed lab results from 4/19/2024.  Lab results are from urgent care.    1. Annual visit for general adult medical examination without abnormal findings  Acute uncomplicated problem.  Annual exam completed today.  We are requesting her ER discharge summary as she reports that she was recently discharged from [facility] after having seizure.  She reports that she was started on  quetiapine in the ER. Discussion today about general wellness and lifestyle habits:  Engage in regular physical and social activities  Skin care, including sunscreen  Recommended annual eye exams and annual dental exams  Discussed wearing seatbelt when in car at all times    2. Elevated AST (SGOT)  3. Elevated ALT measurement  Acute uncomplicated problem.  New problem to examiner.  Requesting records from ER as she reports that she did have updated labs 1 day ago in ER.  For now we will order ultrasound of her abdomen.  Consider referral to GI.  - US-ABDOMEN COMPLETE SURVEY; Future    4. Thrombocytopenia (HCC)  Acute uncomplicated problem.  She is not having any signs of bleeding today.  We are requesting records from ER labs from 1 day ago to see if they completed a CBC.  Checking ultrasound of her abdomen for splenomegaly.    - US-ABDOMEN COMPLETE SURVEY; Future      HCM:  declines HIV and hepatitis C screening.  Requesting pap records.   Labs per orders  Immunizations per orders  Patient counseled about skin care, diet, supplements, prenatal vitamins, safe sex and exercise.    Follow-up: Return for hospital follow-up.        Please note that this dictation was created using voice recognition software. I have made every reasonable attempt to correct obvious errors, but I expect that there are errors of grammar and possibly content that I did not discover before finalizing the note.

## 2024-04-22 NOTE — LETTER
[facility]  LAWANDA TinocoRJULIAN  [address]  [facility] [address]  Fax: [phone]   Authorization for Release/Disclosure of   Protected Health Information   Name: TEJAL CASAS : 1990 SSN: [id number]   Address: [facility] [address]  [facility] [address] Phone:    [phone] (home)    I authorize the entity listed below to release/disclose the PHI below to:   [facility]/LAWANDA TinocoRJULIAN and MILLIE Tinoco   Provider or Entity Name:  [facility]    Address   City, State, Zip   Phone:      Fax:     Reason for request: continuity of care   Information to be released:    [  ] LAST COLONOSCOPY,  including any PATH REPORT and follow-up  [  ] LAST FIT/COLOGUARD RESULT [  ] LAST DEXA  [  ] LAST MAMMOGRAM  [  ] LAST PAP  [  ] LAST LABS [  ] RETINA EXAM REPORT  [  ] IMMUNIZATION RECORDS  [XX] Release all info FROM RECENT ER VISIT       [  ] Check here and initial the line next to each item to release ALL health information INCLUDING  _____ Care and treatment for drug and / or alcohol abuse  _____ HIV testing, infection status, or AIDS  _____ Genetic Testing    DATES OF SERVICE OR TIME PERIOD TO BE DISCLOSED: _____________  I understand and acknowledge that:  * This Authorization may be revoked at any time by you in writing, except if your health information has already been used or disclosed.  * Your health information that will be used or disclosed as a result of you signing this authorization could be re-disclosed by the recipient. If this occurs, your re-disclosed health information may no longer be protected by State or Federal laws.  * You may refuse to sign this Authorization. Your refusal will not affect your ability to obtain treatment.  * This Authorization becomes effective upon signing and will  on (date) __________.      If no date is indicated, this Authorization will  one (1) year from the signature date.    Name: Tejal  Juli Acuña  Signature: Date:   4/22/2024     PLEASE FAX REQUESTED RECORDS BACK TO: [phone]

## 2024-04-22 NOTE — ASSESSMENT & PLAN NOTE
Labs from urgent care showed elevated ALT.  She is not having any right upper quadrant pain or jaundice.  Does have nausea and vomiting.  She does drink alcohol 1-2 drinks a week.  With her thrombocytopenia checking ultrasound of her abdomen.

## 2024-04-22 NOTE — LETTER
[facility]  LAWANDA TinocoRJULIAN  [address]  [place] [address]  Fax: [phone]   Authorization for Release/Disclosure of   Protected Health Information   Name: TEJAL CASAS : 1990 SSN: [id number]   Address: [facility] [address]  [place] [address] Phone:    [phone] (home)    I authorize the entity listed below to release/disclose the PHI below to:   [facility]/LAWANDA TinocoRJULIAN and MILLIE Tinoco   Provider or Entity Name:  [facility]   Address   City, State, Zip   Phone:      Fax:     Reason for request: continuity of care   Information to be released:    [  ] LAST COLONOSCOPY,  including any PATH REPORT and follow-up  [  ] LAST FIT/COLOGUARD RESULT [  ] LAST DEXA  [  ] LAST MAMMOGRAM  [xxx ] LAST PAP  [  ] LAST LABS [  ] RETINA EXAM REPORT  [  ] IMMUNIZATION RECORDS  [  ] Release all info      [  ] Check here and initial the line next to each item to release ALL health information INCLUDING  _____ Care and treatment for drug and / or alcohol abuse  _____ HIV testing, infection status, or AIDS  _____ Genetic Testing    DATES OF SERVICE OR TIME PERIOD TO BE DISCLOSED: _____________  I understand and acknowledge that:  * This Authorization may be revoked at any time by you in writing, except if your health information has already been used or disclosed.  * Your health information that will be used or disclosed as a result of you signing this authorization could be re-disclosed by the recipient. If this occurs, your re-disclosed health information may no longer be protected by State or Federal laws.  * You may refuse to sign this Authorization. Your refusal will not affect your ability to obtain treatment.  * This Authorization becomes effective upon signing and will  on (date) __________.      If no date is indicated, this Authorization will  one (1) year from the signature date.    Name: Tejal Bustos  Domenico  Signature: Date:   4/22/2024     PLEASE FAX REQUESTED RECORDS BACK TO: [phone]

## 2024-06-03 ENCOUNTER — PATIENT MESSAGE (OUTPATIENT)
Dept: MEDICAL GROUP | Facility: PHYSICIAN GROUP | Age: 34
End: 2024-06-03
Payer: COMMERCIAL

## 2024-06-03 DIAGNOSIS — F41.1 GAD (GENERALIZED ANXIETY DISORDER): ICD-10-CM

## 2024-06-04 RX ORDER — QUETIAPINE FUMARATE 25 MG/1
25 TABLET, FILM COATED ORAL DAILY
Qty: 30 TABLET | Refills: 3 | Status: SHIPPED | OUTPATIENT
Start: 2024-06-04

## 2024-06-04 NOTE — PROGRESS NOTES
Patient prescribed seroquel 25 mg daily by [facility]. Patient asking for refill. Re-requesting [facility] records. Did not like how fluoxetine made her feel.     Requested Prescriptions     Signed Prescriptions Disp Refills    QUEtiapine (SEROQUEL) 25 MG Tab 30 Tablet 3     Sig: Take 1 Tablet by mouth every day.     Authorizing Provider: EFREN IRENE A.P.R.N.

## 2024-06-13 DIAGNOSIS — D69.6 THROMBOCYTOPENIA (HCC): ICD-10-CM

## 2024-06-13 DIAGNOSIS — R74.01 ELEVATED AST (SGOT): ICD-10-CM

## 2024-06-13 DIAGNOSIS — R74.01 ELEVATED ALT MEASUREMENT: ICD-10-CM

## 2024-06-14 NOTE — PROGRESS NOTES
Records received from [facility].  Patient discharged home on Seroquel 25 mg daily for anxiety.  She was at the hospital for seizure and hospital recommended follow-up with neurology.  At discharge on 4/21/2024 her platelet count was 92,000, , , alkaline phosphatase 110 and total bili 1.8.  Plan for updated labs.  CBC and CMP ordered.

## 2024-06-20 ENCOUNTER — APPOINTMENT (OUTPATIENT)
Dept: RADIOLOGY | Facility: MEDICAL CENTER | Age: 34
End: 2024-06-20
Attending: STUDENT IN AN ORGANIZED HEALTH CARE EDUCATION/TRAINING PROGRAM

## 2024-06-20 ENCOUNTER — HOSPITAL ENCOUNTER (EMERGENCY)
Facility: MEDICAL CENTER | Age: 34
End: 2024-06-21
Attending: STUDENT IN AN ORGANIZED HEALTH CARE EDUCATION/TRAINING PROGRAM

## 2024-06-20 DIAGNOSIS — S01.01XA LACERATION OF SCALP, INITIAL ENCOUNTER: ICD-10-CM

## 2024-06-20 DIAGNOSIS — S09.90XA CLOSED HEAD INJURY, INITIAL ENCOUNTER: ICD-10-CM

## 2024-06-20 PROCEDURE — 96376 TX/PRO/DX INJ SAME DRUG ADON: CPT

## 2024-06-20 PROCEDURE — 700111 HCHG RX REV CODE 636 W/ 250 OVERRIDE (IP): Mod: JZ | Performed by: STUDENT IN AN ORGANIZED HEALTH CARE EDUCATION/TRAINING PROGRAM

## 2024-06-20 PROCEDURE — 70450 CT HEAD/BRAIN W/O DYE: CPT

## 2024-06-20 PROCEDURE — A9270 NON-COVERED ITEM OR SERVICE: HCPCS | Performed by: STUDENT IN AN ORGANIZED HEALTH CARE EDUCATION/TRAINING PROGRAM

## 2024-06-20 PROCEDURE — 304999 HCHG REPAIR-SIMPLE/INTERMED LEVEL 1

## 2024-06-20 PROCEDURE — 305308 HCHG STAPLER,SKIN,DISP.

## 2024-06-20 PROCEDURE — 700102 HCHG RX REV CODE 250 W/ 637 OVERRIDE(OP): Performed by: STUDENT IN AN ORGANIZED HEALTH CARE EDUCATION/TRAINING PROGRAM

## 2024-06-20 PROCEDURE — 72125 CT NECK SPINE W/O DYE: CPT

## 2024-06-20 PROCEDURE — 700101 HCHG RX REV CODE 250: Performed by: STUDENT IN AN ORGANIZED HEALTH CARE EDUCATION/TRAINING PROGRAM

## 2024-06-20 PROCEDURE — 96374 THER/PROPH/DIAG INJ IV PUSH: CPT

## 2024-06-20 PROCEDURE — 304217 HCHG IRRIGATION SYSTEM

## 2024-06-20 PROCEDURE — 99285 EMERGENCY DEPT VISIT HI MDM: CPT

## 2024-06-20 RX ORDER — HYDROCODONE BITARTRATE AND ACETAMINOPHEN 5; 325 MG/1; MG/1
1 TABLET ORAL ONCE
Status: COMPLETED | OUTPATIENT
Start: 2024-06-20 | End: 2024-06-20

## 2024-06-20 RX ORDER — IBUPROFEN 600 MG/1
600 TABLET ORAL ONCE
Status: DISCONTINUED | OUTPATIENT
Start: 2024-06-20 | End: 2024-06-20

## 2024-06-20 RX ORDER — LIDOCAINE HYDROCHLORIDE AND EPINEPHRINE 10; 10 MG/ML; UG/ML
20 INJECTION, SOLUTION INFILTRATION; PERINEURAL ONCE
Status: COMPLETED | OUTPATIENT
Start: 2024-06-20 | End: 2024-06-20

## 2024-06-20 RX ADMIN — HYDROCODONE BITARTRATE AND ACETAMINOPHEN 1 TABLET: 5; 325 TABLET ORAL at 21:38

## 2024-06-20 RX ADMIN — LIDOCAINE HYDROCHLORIDE AND EPINEPHRINE 20 ML: 10; 10 INJECTION, SOLUTION INFILTRATION; PERINEURAL at 22:00

## 2024-06-20 RX ADMIN — FENTANYL CITRATE 50 MCG: 50 INJECTION, SOLUTION INTRAMUSCULAR; INTRAVENOUS at 22:25

## 2024-06-20 RX ADMIN — FENTANYL CITRATE 50 MCG: 50 INJECTION, SOLUTION INTRAMUSCULAR; INTRAVENOUS at 23:41

## 2024-06-20 ASSESSMENT — FIBROSIS 4 INDEX: FIB4 SCORE: 9.18

## 2024-06-21 VITALS
RESPIRATION RATE: 18 BRPM | OXYGEN SATURATION: 100 % | HEIGHT: 63 IN | SYSTOLIC BLOOD PRESSURE: 127 MMHG | TEMPERATURE: 98 F | BODY MASS INDEX: 19.49 KG/M2 | HEART RATE: 65 BPM | DIASTOLIC BLOOD PRESSURE: 74 MMHG | WEIGHT: 110 LBS

## 2024-06-21 PROCEDURE — 700102 HCHG RX REV CODE 250 W/ 637 OVERRIDE(OP): Performed by: STUDENT IN AN ORGANIZED HEALTH CARE EDUCATION/TRAINING PROGRAM

## 2024-06-21 PROCEDURE — A9270 NON-COVERED ITEM OR SERVICE: HCPCS | Performed by: STUDENT IN AN ORGANIZED HEALTH CARE EDUCATION/TRAINING PROGRAM

## 2024-06-21 RX ORDER — HYDROCODONE BITARTRATE AND ACETAMINOPHEN 5; 325 MG/1; MG/1
1 TABLET ORAL ONCE
Status: COMPLETED | OUTPATIENT
Start: 2024-06-21 | End: 2024-06-21

## 2024-06-21 RX ORDER — IBUPROFEN 600 MG/1
600 TABLET ORAL ONCE
Status: COMPLETED | OUTPATIENT
Start: 2024-06-21 | End: 2024-06-21

## 2024-06-21 RX ADMIN — IBUPROFEN 600 MG: 600 TABLET, FILM COATED ORAL at 00:25

## 2024-06-21 RX ADMIN — HYDROCODONE BITARTRATE AND ACETAMINOPHEN 1 TABLET: 5; 325 TABLET ORAL at 00:25

## 2024-06-21 NOTE — ED PROVIDER NOTES
ED Provider Note    CHIEF COMPLAINT  Chief Complaint   Patient presents with    T-5000 GLF     Slipped and fell while at the pool. Friends witness LOC for about 30 secs. Swelling and possible lacerated wound on the occipital area. No active bleeding. Denies thinners. (+) ETOH. Aox4. Ambulatory to bathroom with steady gait upon ED arrival    Loss of Consciousness       EXTERNAL RECORDS REVIEWED  Outpatient PCP note reviewed for medical history    HPI/ROS  LIMITATION TO HISTORY   Select: : None  OUTSIDE HISTORIAN(S):  Friend at bedside providing collateral history    Ramona Acuña is a 34 y.o. female with no significant past medical history presented to the emergency department for evaluation after a ground-level fall.  Patient says that she was drinking alcohol, was at the pool with her friends, was running around the pool when she slipped on water and fell striking the right parietal scalp on the ground.  She had loss of consciousness for approximately 30 seconds.  In the emergency department complaining of mild headache but has no other complaints.  No neck pain.  No numbness weakness tingling in arms legs.  No chest abdomen back pain.  No pelvic pain.  No lower extremity pain.  Not on anticoagulation.  Unknown last Tdap    PAST MEDICAL HISTORY   has a past medical history of Bronchitis (8/25/2015) and Other plastic surgery for unacceptable cosmetic appearance (2/12/2014).    SURGICAL HISTORY   has a past surgical history that includes other surgical procedure (Right, 2000; 2004); septoplasty (2007); mammoplasty augmentation (2/12/2014); mastopexy (2/12/2014); and primary c section (10/17/09;10/29/12).    FAMILY HISTORY  Family History   Problem Relation Age of Onset    No Known Problems Mother     No Known Problems Father     No Known Problems Maternal Grandmother     No Known Problems Maternal Grandfather     No Known Problems Paternal Grandmother     Cancer Paternal Grandfather         colon    Heart  "Disease Paternal Grandfather 65        unknown type of heart disease    No Known Problems Son     No Known Problems Son        SOCIAL HISTORY  Social History     Tobacco Use    Smoking status: Never    Smokeless tobacco: Never   Vaping Use    Vaping status: Every Day    Substances: Nicotine, Flavoring    Devices: Disposable   Substance and Sexual Activity    Alcohol use: Yes     Comment: 1-2 per week    Drug use: No    Sexual activity: Yes     Partners: Male     Birth control/protection: None       CURRENT MEDICATIONS  Home Medications       Reviewed by Michoacano Campos R.N. (Registered Nurse) on 06/20/24 at 2106  Med List Status: Partial     Medication Last Dose Status   FOLIC ACID PO  Active   Multiple Vitamins-Minerals (MULTIVITAL PO)  Active   ondansetron (ZOFRAN) 4 MG Tab tablet  Active   QUEtiapine (SEROQUEL) 25 MG Tab  Active                  Audit from Redirected Encounters    **Home medications have not yet been reviewed for this encounter**         ALLERGIES  No Known Allergies    PHYSICAL EXAM  VITAL SIGNS: BP (!) 155/80   Pulse 64   Temp 36.7 °C (98 °F) (Temporal)   Resp 18   Ht 1.6 m (5' 3\")   Wt 49.9 kg (110 lb)   LMP 05/20/2024   SpO2 97%   BMI 19.49 kg/m²    General:  non-toxic, no acute distress  Neuro: GCS 15, moving all extremities  HEENT:   - Head: R parietal scalp hematoma with a 3 cm laceration vertically oriented  - Eyes: PERRL, no periorbital ecchymosis  - Midface: Atraumatic and stable  - Ears/Nose: normal external nose and ears, no retroauricular ecchymosis  - Mouth: moist mucosal membranes, atraumatic  Neck: No midline tenderness palpation, full range of motion in lateral rotation, flexion, extension  Chest: Atraumatic, no crepitus or tenderness palpation  Resp: clear to auscultation, no increased work of breathing  CV: Regular rate and rhythm, perfusing well  Abd: Soft, non-tender, non-distended  Pelvis: Stable on anterior posterior compression  Extremities:   RUE: Atraumatic, " nontender to palpation, 2+ radial pulse, SILT, strength intact  LUE: Atraumatic, nontender to palpation, 2+ radial pulse, SILT, strength intact  RLE: Atraumatic, nontender to palpation, 2+ DP pulse, SILT, strength intact  LLE: Atraumatic, nontender to palpation, 2+ DP pulse, SILT, strength intact  Back: Atraumatic, no midline tenderness palpation    DIAGNOSTIC STUDIES / PROCEDURES    EKG  My independent EKG interpretation:  Results for orders placed or performed in visit on 16   HOLTER MONITOR STUDY   Result Value Ref Range    Report       [facility]    Test Date:  2016  Pt Name:    TEJAL HERNÁNDEZ             Department: [facility]  MRN:        [id number]                      Room:  Gender:                                  Technician: Carmen  :        [id number]                   Requested By:Deuce Quispe MD  Order #:    [id number]                    Reading MD: Deuce Quispe MD      Interpretive Statements  *  Monitoring started at 9:20 AM and continued for 48 hours. Rhythm is Sinus  with heart rate in the range of 35 to 125 BPM. Average heart rate was 68 BPM.  The longest R-R interval was 1.8 seconds.  *  Ventricular ectopic activity consisted of 29 multifocal single PVCs.  *  The patient's rhythm included 17 hours two minutes 19 seconds of sinus  bradycardia. The slowest single episode occurred at 4:06:08 AM of Day 2,  lasting nine minutes 22 seconds, with minimum heart rate of 35 BPM.  *  Supraventricular ectopic activity consisted of 183 late beats, 25 single  PACs.  *  One symptom reported in el ry correlates with sinus rhythm with no  ectopics at the time of symptoms occurrence.    Summary: no significant arrhythmias, expected heart rate variation    Electronically Signed On 2016 2:20:44 PDT by Deuce Quispe MD    Electronically Signed On 2016 17:00:56 PDT by Deuce Quispe MD         LABS  Results for orders placed or performed during the hospital encounter of 24    Comp Metabolic Panel   Result Value Ref Range    Sodium 134 (L) 135 - 145 mmol/L    Potassium 3.7 3.6 - 5.5 mmol/L    Chloride 91 (L) 96 - 112 mmol/L    Co2 23 20 - 33 mmol/L    Anion Gap 20.0 (H) 7.0 - 16.0    Glucose 89 65 - 99 mg/dL    Bun 6 (L) 8 - 22 mg/dL    Creatinine 0.52 0.50 - 1.40 mg/dL    Calcium 9.8 8.5 - 10.5 mg/dL    Correct Calcium 9.2 8.5 - 10.5 mg/dL    AST(SGOT) 198 (H) 12 - 45 U/L    ALT(SGPT) 110 (H) 2 - 50 U/L    Alkaline Phosphatase 94 30 - 99 U/L    Total Bilirubin 2.2 (H) 0.1 - 1.5 mg/dL    Albumin 4.8 3.2 - 4.9 g/dL    Total Protein 7.5 6.0 - 8.2 g/dL    Globulin 2.7 1.9 - 3.5 g/dL    A-G Ratio 1.8 g/dL   CBC WITH DIFFERENTIAL   Result Value Ref Range    WBC 4.4 (L) 4.8 - 10.8 K/uL    RBC 4.22 4.20 - 5.40 M/uL    Hemoglobin 13.4 12.0 - 16.0 g/dL    Hematocrit 40.9 37.0 - 47.0 %    MCV 96.9 81.4 - 97.8 fL    MCH 31.8 27.0 - 33.0 pg    MCHC 32.8 32.2 - 35.5 g/dL    RDW 54.2 (H) 35.9 - 50.0 fL    Platelet Count 74 (L) 164 - 446 K/uL    MPV 11.3 9.0 - 12.9 fL    Neutrophils-Polys 60.90 44.00 - 72.00 %    Lymphocytes 30.20 22.00 - 41.00 %    Monocytes 6.60 0.00 - 13.40 %    Eosinophils 0.70 0.00 - 6.90 %    Basophils 1.40 0.00 - 1.80 %    Immature Granulocytes 0.20 0.00 - 0.90 %    Nucleated RBC 0.00 0.00 - 0.20 /100 WBC    Neutrophils (Absolute) 2.66 1.82 - 7.42 K/uL    Lymphs (Absolute) 1.32 1.00 - 4.80 K/uL    Monos (Absolute) 0.29 0.00 - 0.85 K/uL    Eos (Absolute) 0.03 0.00 - 0.51 K/uL    Baso (Absolute) 0.06 0.00 - 0.12 K/uL    Immature Granulocytes (abs) 0.01 0.00 - 0.11 K/uL    NRBC (Absolute) 0.00 K/uL   FREE THYROXINE   Result Value Ref Range    Free T-4 1.26 0.93 - 1.70 ng/dL   TSH   Result Value Ref Range    TSH 4.100 0.380 - 5.330 uIU/mL   ESTIMATED GFR   Result Value Ref Range    GFR (CKD-EPI) 123 >60 mL/min/1.73 m 2   IMMATURE PLT FRACTION   Result Value Ref Range    Imm. Plt Fraction 8.7 0.6 - 13.1 %       RADIOLOGY  I have independently interpreted the diagnostic imaging  associated with this visit and am waiting the final reading from the radiologist.   My preliminary interpretation is as follows:   - Reviewed CT scan of the head and cervical spine which showed no evidence of acute intracranial process  Radiologist interpretation:   CT-CSPINE WITHOUT PLUS RECONS   Final Result      1.  Moderate to marked osteoarthritic changes at C5-6 and C6-7 levels.      2.  No evidence for acute cervical spine fracture and/or subluxation.      CT-HEAD W/O   Final Result      1.  Subcutaneous hematoma in the right parietal-occipital region.      2.  Mild diffuse cerebral atrophy.                       MEDICAL DECISION MAKING      ED COURSE AND PLAN    Ramona Acuña is a 34 y.o. female to the emergency department after a fall while running next to a pool under the influence of alcohol.  On arrival to the emergency department she is mentating well, her vital signs are stable.  She has a hematoma to the right parietal region with a 4 cm laceration.   She was slightly inebriated on arrival to the emergency department.  She was taken to scanner, underwent CT scan of the head and cervical spine which showed no evidence of acute intracranial or cervical process.    Her tetanus is up-to-date.  Laceration closed as described below.  She was treated for pain with Norco Motrin and fentanyl.    On reevaluation, patient is mentating well, no repetitive questioning or concussive symptoms in the emergency department.  She has a ride to go home with and she is clinically sober.  At this time is appropriate for discharge home, return precautions discussed.      ---Pertinent ED Course---:    12:20 AM I reviewed the patient's old records in Epic, medication list, allergies, past medical history and performed a physical examination.         Procedures:  Laceration Repair Procedure Note    Indication: Laceration    Procedure:  Wound margins were anesthetized with lidocaine laceration was copiously irrigated in the  usual fashion.  No foreign bodies were identified.  Suture closed with 4 staples      Total repaired wound length: 4 cm.     Other Items: None    The patient tolerated the procedure well.    Complications: None     ----------------------------------------------------------------------------------  DISCUSSIONS    I have discussed management of the patient with the following physicians and ODALIS's:      Discussion of management with other [facility] or appropriate source(s):     Escalation of care considered, and ultimately not performed: Considered but no indication for labs.    Barriers to care at this time, including but not limited to:     Decision tools and prescription drugs considered including, but not limited to:     FINAL IMPRESSION    1. Closed head injury, initial encounter    2. Laceration of scalp, initial encounter        New Prescriptions    No medications on file         DISPOSITION    Discharge home, Stable      This chart was dictated using an electronic voice recognition software. The chart has been reviewed and edited but there is still possibility for dictation errors due to limitation of software.    Remington Pierce, DO 6/21/2024

## 2024-06-21 NOTE — DISCHARGE INSTRUCTIONS
You were seen in the emergency department for a laceration.  Laceration was closed with 4 staples.  Please return to the emergency department, to your primary care doctor's office, or to an urgent care to get the stitches removed in 5-7  days.    Monitor for signs of infection including:  - Redness  - Increased pain  - Drainage   - Fevers/Chills    Come back to the emergency department if you develop any of these symptoms

## 2024-06-21 NOTE — ED NOTES
Assist RN: Wound irrigated with NS, pt tolerated well. Pt reports increasing pain, ERP notified. Pt medicated per MAR for pain. ERP at bedside for wound repair.

## 2024-06-21 NOTE — ED TRIAGE NOTES
Ramona Bustos Domenico  34 y.o. female  Chief Complaint   Patient presents with    T-5000 GLF     Slipped and fell while at the pool. Friends witness LOC for about 30 secs. Swelling and possible lacerated wound on the occipital area. No active bleeding. Denies thinners. (+) ETOH. Aox4. Ambulatory to bathroom with steady gait upon ED arrival    Loss of Consciousness     Pt BIB EMS for above complaint.    Pt is GCS 15, speaking in full sentences, follows commands and responds appropriately to questions. Resp are even and unlabored.    Vitals:    06/20/24 2104   BP: (!) 155/80   Pulse: 64   Resp: 18   Temp: 36.7 °C (98 °F)   SpO2: 97%

## 2024-10-02 ENCOUNTER — TELEMEDICINE (OUTPATIENT)
Dept: MEDICAL GROUP | Facility: PHYSICIAN GROUP | Age: 34
End: 2024-10-02

## 2024-10-02 VITALS
DIASTOLIC BLOOD PRESSURE: 71 MMHG | HEIGHT: 63 IN | SYSTOLIC BLOOD PRESSURE: 98 MMHG | WEIGHT: 114 LBS | BODY MASS INDEX: 20.2 KG/M2 | TEMPERATURE: 97.5 F

## 2024-10-02 DIAGNOSIS — Z87.898 HISTORY OF SEIZURE DUE TO ALCOHOL WITHDRAWAL: ICD-10-CM

## 2024-10-02 DIAGNOSIS — Z02.89 ENCOUNTER FOR COMPLETION OF FORM WITH PATIENT: ICD-10-CM

## 2024-10-02 DIAGNOSIS — F41.1 GAD (GENERALIZED ANXIETY DISORDER): ICD-10-CM

## 2024-10-02 DIAGNOSIS — Z86.59 HISTORY OF SEIZURE DUE TO ALCOHOL WITHDRAWAL: ICD-10-CM

## 2024-10-02 PROCEDURE — 99213 OFFICE O/P EST LOW 20 MIN: CPT | Performed by: NURSE PRACTITIONER

## 2024-10-02 PROCEDURE — 7105 PR DMV PHYSICAL: Performed by: NURSE PRACTITIONER

## 2024-10-02 RX ORDER — TRAZODONE HYDROCHLORIDE 50 MG/1
TABLET, FILM COATED ORAL
COMMUNITY
Start: 2024-09-13

## 2024-10-02 ASSESSMENT — FIBROSIS 4 INDEX: FIB4 SCORE: 9.18

## 2024-10-10 DIAGNOSIS — F41.1 GAD (GENERALIZED ANXIETY DISORDER): ICD-10-CM

## 2024-10-14 RX ORDER — QUETIAPINE FUMARATE 25 MG/1
25 TABLET, FILM COATED ORAL EVERY EVENING
Qty: 90 TABLET | Refills: 1 | Status: SHIPPED | OUTPATIENT
Start: 2024-10-14

## 2024-12-28 ENCOUNTER — OFFICE VISIT (OUTPATIENT)
Dept: URGENT CARE | Facility: PHYSICIAN GROUP | Age: 34
End: 2024-12-28
Payer: MEDICAID

## 2024-12-28 VITALS
DIASTOLIC BLOOD PRESSURE: 84 MMHG | SYSTOLIC BLOOD PRESSURE: 122 MMHG | RESPIRATION RATE: 14 BRPM | HEIGHT: 63 IN | BODY MASS INDEX: 22.5 KG/M2 | TEMPERATURE: 97.7 F | WEIGHT: 127 LBS | OXYGEN SATURATION: 99 % | HEART RATE: 75 BPM

## 2024-12-28 DIAGNOSIS — M62.838 MUSCLE SPASMS OF NECK: ICD-10-CM

## 2024-12-28 DIAGNOSIS — M62.830 SPASM OF LEFT TRAPEZIUS MUSCLE: ICD-10-CM

## 2024-12-28 DIAGNOSIS — S16.1XXA ACUTE CERVICAL MYOFASCIAL STRAIN, INITIAL ENCOUNTER: Primary | ICD-10-CM

## 2024-12-28 PROCEDURE — 3074F SYST BP LT 130 MM HG: CPT | Performed by: PHYSICIAN ASSISTANT

## 2024-12-28 PROCEDURE — 99213 OFFICE O/P EST LOW 20 MIN: CPT | Performed by: PHYSICIAN ASSISTANT

## 2024-12-28 PROCEDURE — 3079F DIAST BP 80-89 MM HG: CPT | Performed by: PHYSICIAN ASSISTANT

## 2024-12-28 RX ORDER — CYCLOBENZAPRINE HCL 10 MG
10 TABLET ORAL 3 TIMES DAILY PRN
Qty: 30 TABLET | Refills: 0 | Status: SHIPPED | OUTPATIENT
Start: 2024-12-28

## 2024-12-28 RX ORDER — MELOXICAM 15 MG/1
15 TABLET ORAL DAILY
Qty: 30 TABLET | Refills: 0 | Status: SHIPPED | OUTPATIENT
Start: 2024-12-28

## 2024-12-28 ASSESSMENT — ENCOUNTER SYMPTOMS
BACK PAIN: 0
SENSORY CHANGE: 0
PALPITATIONS: 0
TINGLING: 0
MYALGIAS: 1
HEADACHES: 0
COUGH: 0
DIZZINESS: 0
NECK PAIN: 1
FOCAL WEAKNESS: 0
SPEECH CHANGE: 0

## 2024-12-28 ASSESSMENT — FIBROSIS 4 INDEX: FIB4 SCORE: 9.18

## 2024-12-28 NOTE — PROGRESS NOTES
Subjective     Ramona Acuña is a 34 y.o. female who presents with Neck Pain (Started on 2024 /Pain starts in the base of the skull and goes down the middle of neck down into the back /On xmas p/t was playing basket ball and then possibly slept on it funny )    PMH:  has a past medical history of Bronchitis (2015) and Other plastic surgery for unacceptable cosmetic appearance (2014).  MEDS:   Current Outpatient Medications:     QUEtiapine (SEROQUEL) 25 MG Tab, Take 1 Tablet by mouth every evening., Disp: 90 Tablet, Rfl: 1    ondansetron (ZOFRAN) 4 MG Tab tablet, Take 1 Tablet by mouth every four hours as needed for Nausea/Vomiting., Disp: 20 Tablet, Rfl: 0    Multiple Vitamins-Minerals (MULTIVITAL PO), Take  by mouth., Disp: , Rfl:     HYDROXYZINE PAMOATE PO, , Disp: , Rfl:     FOLIC ACID PO, Take  by mouth. Pt doesn't know exact dose., Disp: , Rfl:   ALLERGIES: No Known Allergies  SURGHX:   Past Surgical History:   Procedure Laterality Date    MAMMOPLASTY AUGMENTATION  2014    Performed by Roxana Lamb M.D. at SURGERY [facility] ORS    MASTOPEXY  2014    Performed by Roxana Lamb M.D. at SURGERY [facility] ORS    SEPTOPLASTY      OTHER SURGICAL PROCEDURE Right ;     ear drum repair    PRIMARY C SECTION  10/17/09;10/29/12     x 2     SOCHX:  reports that she has never smoked. She has never used smokeless tobacco. She reports that she does not currently use alcohol. She reports that she does not use drugs.  FH: Reviewed with patient, not pertinent to this visit.           Patient presents with neck muscle pain that started 3 days ago after playing basketball with her kids.  Patient states she has pain in the left side of her neck that starts at the base of her skull goes along the left side of her neck and into her left upper shoulder.  Patient denies dizziness, headache, vision changes, numbness or tingling to her left upper extremity.   "Patient has tried massage and some heat as well as ibuprofen with no improvement but no worsening.  Patient states she is having a difficult time sleeping due to the neck pain.  No other complaints.      Neck Pain   Pertinent negatives include no chest pain, headaches or tingling.       Review of Systems   Respiratory:  Negative for cough.    Cardiovascular:  Negative for chest pain and palpitations.   Musculoskeletal:  Positive for myalgias and neck pain. Negative for back pain and joint pain.   Neurological:  Negative for dizziness, tingling, sensory change, speech change, focal weakness and headaches.   All other systems reviewed and are negative.             Objective     /84 (BP Location: Left arm, Patient Position: Sitting, BP Cuff Size: Adult)   Pulse 75   Temp 36.5 °C (97.7 °F) (Temporal)   Resp 14   Ht 1.6 m (5' 3\")   Wt 57.6 kg (127 lb)   SpO2 99%   BMI 22.50 kg/m²      Physical Exam  Vitals and nursing note reviewed.   Constitutional:       General: She is not in acute distress.     Appearance: Normal appearance. She is well-developed. She is not ill-appearing or toxic-appearing.   HENT:      Head: Normocephalic and atraumatic.      Right Ear: Tympanic membrane normal.      Left Ear: Tympanic membrane normal.      Nose: Nose normal.      Mouth/Throat:      Lips: Pink.      Mouth: Mucous membranes are moist.      Pharynx: Oropharynx is clear. Uvula midline.   Eyes:      Extraocular Movements: Extraocular movements intact.      Conjunctiva/sclera: Conjunctivae normal.      Pupils: Pupils are equal, round, and reactive to light.   Cardiovascular:      Rate and Rhythm: Normal rate and regular rhythm.      Pulses: Normal pulses.      Heart sounds: Normal heart sounds.   Pulmonary:      Effort: Pulmonary effort is normal.      Breath sounds: Normal breath sounds.   Abdominal:      General: Bowel sounds are normal.      Palpations: Abdomen is soft.   Musculoskeletal:         General: Normal range of " motion.      Cervical back: Normal range of motion and neck supple. Spasms and tenderness present. No rigidity, torticollis, bony tenderness or crepitus. Normal range of motion.        Back:       Comments: No midline tenderness to spine.  Spasm palpable to trapezius on left.  Bilateral upper extremity strength 5 out of 5.   Skin:     General: Skin is warm and dry.      Capillary Refill: Capillary refill takes less than 2 seconds.   Neurological:      General: No focal deficit present.      Mental Status: She is alert and oriented to person, place, and time.      Cranial Nerves: No cranial nerve deficit.      Motor: Motor function is intact.      Coordination: Coordination is intact.      Gait: Gait normal.   Psychiatric:         Mood and Affect: Mood normal.                             Assessment & Plan        Assessment & Plan  Acute cervical myofascial strain, initial encounter    Orders:    cyclobenzaprine (FLEXERIL) 10 mg Tab; Take 1 Tablet by mouth 3 times a day as needed for Muscle Spasms.    meloxicam (MOBIC) 15 MG tablet; Take 1 Tablet by mouth every day.    Muscle spasms of neck    Orders:    cyclobenzaprine (FLEXERIL) 10 mg Tab; Take 1 Tablet by mouth 3 times a day as needed for Muscle Spasms.    meloxicam (MOBIC) 15 MG tablet; Take 1 Tablet by mouth every day.    Spasm of left trapezius muscle    Orders:    cyclobenzaprine (FLEXERIL) 10 mg Tab; Take 1 Tablet by mouth 3 times a day as needed for Muscle Spasms.    meloxicam (MOBIC) 15 MG tablet; Take 1 Tablet by mouth every day.          Patient HPI physical exam consistent with acute trapezius and cervical muscle spasms on the left.  No midline tenderness, no concern for bony injury.  I will treat with Mobic 15 mg daily for pain and inflammation, Flexeril up to 3 times daily as needed for spasm.      Gentle range of motion exercises discussed, demonstrated and encouraged.    Differential diagnosis, supportive care, and indications for immediate follow-up  discussed with patient.  Instructed to return to clinic or nearest emergency department for any change in condition, further concerns, or worsening of symptoms.    I personally reviewed prior external notes and test results pertinent to today's visit.  I have independently reviewed and interpreted all diagnostics ordered during this urgent care visit.    PT should follow up with PCP in 1-2 days for re-evaluation if symptoms have not improved.      Discussed red flags and reasons to return to UC or ED.      Pt and/or family verbalized understanding of diagnosis and follow up instructions and was offered informational handout on diagnosis.  PT discharged.     Please note that this dictation was created using voice recognition software. I have made every reasonable attempt to correct obvious errors, but I expect that there may be errors of grammar and possibly content that I did not discover before finalizing the note.

## 2025-01-29 ENCOUNTER — APPOINTMENT (OUTPATIENT)
Dept: MEDICAL GROUP | Facility: PHYSICIAN GROUP | Age: 35
End: 2025-01-29
Payer: MEDICAID

## 2025-03-20 ENCOUNTER — APPOINTMENT (OUTPATIENT)
Dept: RADIOLOGY | Facility: MEDICAL CENTER | Age: 35
End: 2025-03-20
Attending: OBSTETRICS & GYNECOLOGY
Payer: MEDICAID

## 2025-04-03 DIAGNOSIS — F41.1 GAD (GENERALIZED ANXIETY DISORDER): ICD-10-CM

## 2025-04-03 RX ORDER — QUETIAPINE FUMARATE 25 MG/1
25 TABLET, FILM COATED ORAL EVERY EVENING
Qty: 90 TABLET | Refills: 0 | Status: SHIPPED | OUTPATIENT
Start: 2025-04-03

## 2025-04-04 ENCOUNTER — OFFICE VISIT (OUTPATIENT)
Dept: URGENT CARE | Facility: PHYSICIAN GROUP | Age: 35
End: 2025-04-04
Payer: COMMERCIAL

## 2025-04-04 VITALS
DIASTOLIC BLOOD PRESSURE: 80 MMHG | SYSTOLIC BLOOD PRESSURE: 126 MMHG | HEIGHT: 63 IN | OXYGEN SATURATION: 96 % | TEMPERATURE: 97.9 F | WEIGHT: 121.6 LBS | BODY MASS INDEX: 21.55 KG/M2 | HEART RATE: 98 BPM | RESPIRATION RATE: 14 BRPM

## 2025-04-04 DIAGNOSIS — M54.50 ACUTE BILATERAL LOW BACK PAIN WITHOUT SCIATICA: ICD-10-CM

## 2025-04-04 DIAGNOSIS — R11.0 NAUSEA: ICD-10-CM

## 2025-04-04 LAB
APPEARANCE UR: CLEAR
BILIRUB UR STRIP-MCNC: NEGATIVE MG/DL
COLOR UR AUTO: NORMAL
GLUCOSE UR STRIP.AUTO-MCNC: NEGATIVE MG/DL
KETONES UR STRIP.AUTO-MCNC: NEGATIVE MG/DL
LEUKOCYTE ESTERASE UR QL STRIP.AUTO: NEGATIVE
NITRITE UR QL STRIP.AUTO: NEGATIVE
PH UR STRIP.AUTO: 5.5 [PH] (ref 5–8)
POCT INT CON NEG: NEGATIVE
POCT INT CON POS: POSITIVE
POCT URINE PREGNANCY TEST: NEGATIVE
PROT UR QL STRIP: NEGATIVE MG/DL
RBC UR QL AUTO: NEGATIVE
SP GR UR STRIP.AUTO: 1.01
UROBILINOGEN UR STRIP-MCNC: 0.2 MG/DL

## 2025-04-04 PROCEDURE — 81025 URINE PREGNANCY TEST: CPT | Performed by: FAMILY MEDICINE

## 2025-04-04 PROCEDURE — 3079F DIAST BP 80-89 MM HG: CPT | Performed by: FAMILY MEDICINE

## 2025-04-04 PROCEDURE — 3074F SYST BP LT 130 MM HG: CPT | Performed by: FAMILY MEDICINE

## 2025-04-04 PROCEDURE — 81002 URINALYSIS NONAUTO W/O SCOPE: CPT | Performed by: FAMILY MEDICINE

## 2025-04-04 PROCEDURE — 99213 OFFICE O/P EST LOW 20 MIN: CPT | Performed by: FAMILY MEDICINE

## 2025-04-04 ASSESSMENT — ENCOUNTER SYMPTOMS
BACK PAIN: 1
MYALGIAS: 1
NAUSEA: 1
FEVER: 0

## 2025-04-04 ASSESSMENT — FIBROSIS 4 INDEX: FIB4 SCORE: 9.44

## 2025-04-04 NOTE — LETTER
April 4, 2025    To Whom It May Concern:         This is confirmation that Ramona Acuña attended her scheduled appointment with Gael Guevara M.D. on 4/04/25.  Please excuse her from work due to an acute illness.  She is anticipated to be out of work 4/4 to 4/7 and may return after that.  She may return sooner if feeling better more quickly than expected.         If you have any questions please do not hesitate to call me at the phone number listed below.    Sincerely,          Gael Guevara M.D.  [phone]

## 2025-04-04 NOTE — TELEPHONE ENCOUNTER
Requested Prescriptions     Signed Prescriptions Disp Refills    QUEtiapine (SEROQUEL) 25 MG Tab 90 Tablet 0     Sig: Take 1 Tablet by mouth every evening.     Authorizing Provider: EFREN IRENE A.P.R.N.

## 2025-04-05 NOTE — PROGRESS NOTES
"Subjective:     Ramona Acuña is a 35 y.o. female who presents for Back Pain (Lower back pain,fatigue,body aches  x 1 day )    HPI  Pt presents for evaluation of an acute problem  Patient with an acute illness for the past day  Having low back pain, myalgia, and fatigue  Has had some nausea as well  No significant abdominal pain  No cough, nasal congestion, sore throat  No fevers    Review of Systems   Constitutional:  Negative for fever.   Gastrointestinal:  Positive for nausea.   Musculoskeletal:  Positive for back pain and myalgias.     PMH:  has a past medical history of Bronchitis (2015) and Other plastic surgery for unacceptable cosmetic appearance (2014).  MEDS:   Current Outpatient Medications:     QUEtiapine (SEROQUEL) 25 MG Tab, Take 1 Tablet by mouth every evening., Disp: 90 Tablet, Rfl: 0  ALLERGIES: No Known Allergies  SURGHX:   Past Surgical History:   Procedure Laterality Date    MAMMOPLASTY AUGMENTATION  2014    Performed by Roxana Lamb M.D. at SURGERY [facility]S    MASTOPEXY  2014    Performed by [facility] M[facility] at [facility]    SEPTOPLASTY  2007    OTHER SURGICAL PROCEDURE Right 2000;     ear drum repair    PRIMARY C SECTION  10/17/09;10/29/12     x 2     SOCHX:  reports that she has never smoked. She has never used smokeless tobacco. She reports that she does not currently use alcohol. She reports that she does not use drugs.     Objective:   /80   Pulse 98   Temp 36.6 °C (97.9 °F) (Temporal)   Resp 14   Ht 1.6 m (5' 3\")   Wt 55.2 kg (121 lb 9.6 oz)   SpO2 96%   BMI 21.54 kg/m²     Physical Exam  Constitutional:       General: She is not in acute distress.     Appearance: She is well-developed. She is not diaphoretic.   Pulmonary:      Effort: Pulmonary effort is normal.   Abdominal:      General: Abdomen is flat. There is no distension.      Palpations: Abdomen is soft.      Tenderness: There is no " abdominal tenderness. There is no right CVA tenderness, left CVA tenderness, guarding or rebound.   Neurological:      Mental Status: She is alert.     Back:  General: No asymmetry, bruising, or erythema appreciated  ROM: flexion 90°, extension 30°, lateral bend 30°, lateral twist 30°  Palpation: No tenderness to palpation of spinous processes, no step-offs appreciated, +TTP along lumbar paraspinal muscles  Neuro: Sensation intact and equal bilaterally in LE's    Assessment/Plan:   Assessment    1. Acute bilateral low back pain without sciatica  - POCT Urinalysis  - POCT Pregnancy    2. Nausea  - POCT Urinalysis  - POCT Pregnancy    Patient with low back pain and nausea.  Point-of-care urine and point-of-care hCG negative.  Advised that this could be muscular or could be related to start of influenza, viral gastroenteritis, or other illness.  Offered further testing and patient declines.  Given note for work and reviewed supportive care measures.  She will follow-up in the urgent care as needed.

## 2025-04-12 ENCOUNTER — HOSPITAL ENCOUNTER (EMERGENCY)
Facility: MEDICAL CENTER | Age: 35
End: 2025-04-13
Attending: EMERGENCY MEDICINE
Payer: COMMERCIAL

## 2025-04-12 DIAGNOSIS — M62.838 MUSCLE SPASM: ICD-10-CM

## 2025-04-12 DIAGNOSIS — S39.012A BACK STRAIN, INITIAL ENCOUNTER: ICD-10-CM

## 2025-04-12 LAB — EKG IMPRESSION: NORMAL

## 2025-04-12 PROCEDURE — 0241U HCHG SARS-COV-2 COVID-19 NFCT DS RESP RNA 4 TRGT MIC: CPT

## 2025-04-12 PROCEDURE — 93005 ELECTROCARDIOGRAM TRACING: CPT | Mod: TC | Performed by: EMERGENCY MEDICINE

## 2025-04-12 PROCEDURE — 99284 EMERGENCY DEPT VISIT MOD MDM: CPT

## 2025-04-12 PROCEDURE — 94760 N-INVAS EAR/PLS OXIMETRY 1: CPT

## 2025-04-12 PROCEDURE — 93005 ELECTROCARDIOGRAM TRACING: CPT | Mod: TC

## 2025-04-12 ASSESSMENT — FIBROSIS 4 INDEX: FIB4 SCORE: 9.44

## 2025-04-12 NOTE — Clinical Note
Ramona Acuña was seen and treated in our emergency department on 4/12/2025.  She may return to work on 04/15/2025.       If you have any questions or concerns, please don't hesitate to call.      Mike Mcgee M.D.

## 2025-04-13 VITALS
RESPIRATION RATE: 20 BRPM | DIASTOLIC BLOOD PRESSURE: 95 MMHG | OXYGEN SATURATION: 98 % | HEART RATE: 85 BPM | WEIGHT: 115.96 LBS | HEIGHT: 63 IN | TEMPERATURE: 97.6 F | SYSTOLIC BLOOD PRESSURE: 159 MMHG | BODY MASS INDEX: 20.55 KG/M2

## 2025-04-13 LAB
FLUAV RNA SPEC QL NAA+PROBE: NEGATIVE
FLUBV RNA SPEC QL NAA+PROBE: NEGATIVE
RSV RNA SPEC QL NAA+PROBE: NEGATIVE
SARS-COV-2 RNA RESP QL NAA+PROBE: NOTDETECTED
SPECIMEN SOURCE: NORMAL

## 2025-04-13 PROCEDURE — 99284 EMERGENCY DEPT VISIT MOD MDM: CPT

## 2025-04-13 PROCEDURE — 700102 HCHG RX REV CODE 250 W/ 637 OVERRIDE(OP): Performed by: EMERGENCY MEDICINE

## 2025-04-13 PROCEDURE — 700101 HCHG RX REV CODE 250: Performed by: EMERGENCY MEDICINE

## 2025-04-13 PROCEDURE — 62272 THER SPI PNXR DRG CSF: CPT

## 2025-04-13 PROCEDURE — 700111 HCHG RX REV CODE 636 W/ 250 OVERRIDE (IP): Performed by: EMERGENCY MEDICINE

## 2025-04-13 PROCEDURE — 20552 NJX 1/MLT TRIGGER POINT 1/2: CPT

## 2025-04-13 PROCEDURE — 94760 N-INVAS EAR/PLS OXIMETRY 1: CPT

## 2025-04-13 PROCEDURE — A9270 NON-COVERED ITEM OR SERVICE: HCPCS | Performed by: EMERGENCY MEDICINE

## 2025-04-13 RX ORDER — DIAZEPAM 5 MG/1
5 TABLET ORAL ONCE
Status: COMPLETED | OUTPATIENT
Start: 2025-04-13 | End: 2025-04-13

## 2025-04-13 RX ORDER — DIAZEPAM 5 MG/1
5 TABLET ORAL EVERY 8 HOURS PRN
Qty: 10 TABLET | Refills: 0 | Status: SHIPPED | OUTPATIENT
Start: 2025-04-13 | End: 2025-04-16

## 2025-04-13 RX ORDER — CYCLOBENZAPRINE HCL 5 MG
5-10 TABLET ORAL 3 TIMES DAILY PRN
Qty: 20 TABLET | Refills: 0 | Status: SHIPPED | OUTPATIENT
Start: 2025-04-13

## 2025-04-13 RX ORDER — LIDOCAINE HYDROCHLORIDE 20 MG/ML
20 INJECTION, SOLUTION INFILTRATION; PERINEURAL ONCE
Status: COMPLETED | OUTPATIENT
Start: 2025-04-13 | End: 2025-04-13

## 2025-04-13 RX ORDER — DIAZEPAM 5 MG/1
5 TABLET ORAL EVERY 8 HOURS PRN
Qty: 10 TABLET | Refills: 0 | Status: SHIPPED | OUTPATIENT
Start: 2025-04-13 | End: 2025-04-13

## 2025-04-13 RX ORDER — ONDANSETRON 4 MG/1
4 TABLET, ORALLY DISINTEGRATING ORAL ONCE
Status: COMPLETED | OUTPATIENT
Start: 2025-04-13 | End: 2025-04-13

## 2025-04-13 RX ADMIN — ONDANSETRON 4 MG: 4 TABLET, ORALLY DISINTEGRATING ORAL at 00:33

## 2025-04-13 RX ADMIN — LIDOCAINE HYDROCHLORIDE 20 ML: 20 INJECTION, SOLUTION INFILTRATION; PERINEURAL at 00:45

## 2025-04-13 RX ADMIN — DIAZEPAM 5 MG: 5 TABLET ORAL at 01:19

## 2025-04-13 NOTE — ED PROVIDER NOTES
"  CHIEF COMPLAINT  Chief Complaint   Patient presents with    Back Pain     Pt states that she has having back pain and N/V for a week but no fever.         LIMITATION TO HISTORY   None    HPI    Ramona Acuña is a 35 y.o. female to previous visits for back pain 1 in their clinic which is noted above in the  At [facility] in which received narcotics and steroids comes in today for worsening back pain status bilateral in her back.  More on the left than the right.  Torsion she moves it.  She states he is \"unable to get any sleep\".  And is frustrated.  There is no bowel bladder incontinence  No fever  No history of IVDU use  No history of immunocompromise  History of bacteremia  No history of saddle paresthesia or bowel bladder incontinence    Patient states that this is very frustrating for her.  She stopped taking steroids only ibuprofen to help she is also stopped taking the [place].    OUTSIDE HISTORIAN(S):  Significant is at the bedside.  He states that with bothering her mostly that she cannot sleep.    EXTERNAL RECORDS REVIEWED  I reviewed the chart patient was seen earlier this month patient was told she had back pain.  Look like she was given his conservative therapy.  I have no note from the [facility] visit    REVIEW OF SYSTEMS  Above.    PAST MEDICAL HISTORY  Past Medical History:   Diagnosis Date    Bronchitis 8/25/2015    Other plastic surgery for unacceptable cosmetic appearance 2/12/2014       FAMILY HISTORY  Family History   Problem Relation Age of Onset    No Known Problems Mother     No Known Problems Father     No Known Problems Maternal Grandmother     No Known Problems Maternal Grandfather     No Known Problems Paternal Grandmother     Cancer Paternal Grandfather         colon    Heart Disease Paternal Grandfather 65        unknown type of heart disease    No Known Problems Son     No Known Problems Son        SOCIAL HISTORY  Social History     Tobacco Use    Smoking status: " "Never    Smokeless tobacco: Never   Vaping Use    Vaping status: Every Day    Substances: Nicotine, Flavoring    Devices: Disposable   Substance Use Topics    Alcohol use: Not Currently     Comment: stopped    Drug use: No     Social History     Substance and Sexual Activity   Drug Use No       SURGICAL HISTORY  Past Surgical History:   Procedure Laterality Date    MAMMOPLASTY AUGMENTATION  2014    Performed by Roxana Lamb M.D. at SURGERY [facility]    MASTOPEXY  2014    Performed by Roxana Lamb M.D. at SURGERY [facility]    SEPTOPLASTY  2007    OTHER SURGICAL PROCEDURE Right ; 2004    ear drum repair    PRIMARY C SECTION  10/17/09;10/29/12     x 2       CURRENT MEDICATIONS  No current facility-administered medications for this encounter.    Current Outpatient Medications:     QUEtiapine (SEROQUEL) 25 MG Tab, Take 1 Tablet by mouth every evening., Disp: 90 Tablet, Rfl: 0    ALLERGIES  No Known Allergies    PHYSICAL EXAM  VITAL SIGNS: BP (!) 159/95   Pulse 85   Temp 36.4 °C (97.6 °F) (Temporal)   Resp 20   Ht 1.6 m (5' 3\")   Wt 52.6 kg (115 lb 15.4 oz)   SpO2 98%   BMI 20.54 kg/m²   Reviewed and noted.  Elevated blood pressure  Constitutional: Well developed, Well nourished, uncomfortable.  HENT: Normocephalic, atraumatic, bilateral external ears normal, No intraoral erythema, edema, exudate  Eyes: PERRLA, conjunctiva pink, no scleral icterus.   Cardiovascular: Regular rate and rhythm. No murmurs, rubs or gallops.  No dependent edema or calf tenderness  Respiratory: Lungs clear to auscultation bilaterally. No wheezes, rales, or rhonchi.  Abdominal:  Abdomen soft, non-tender, non distended. No rebound, or guarding.    Skin: No erythema, no rash.  The back noted  Genitourinary: No costovertebral angle tenderness.   Musculoskeletal: no significant midline tenderness step-off or deformity.  She is significant paraspinal tenderness over the L1-L2 bilaterally.  " With spasm noted.  Patient is tearful at this time doing this exam  Neurologic:   Patient has normal gait.  Patient has no decree sensation light touch on the lower extremities  No saddle paresthesia.          PROBLEMS EVALUATED THIS VISIT:  Patient has a history of back pain she is the third time she had been seen in the 10 days.  She would just like to get some sleep.  Patient has no clinical signs and her neurological cauda equina syndrome.  Patient looks otherwise well.    MEDICAL DECISION MAKING:  At this point patient most likely appears to have musculoskeletal back pain she has no clinical signs risk factors suggest osteomyelitis, epidural abscess, cauda equina syndrome at this time.       PLAN:  Trigger point injections  Valium      RISK:  Patient get a prescription of Valium and get a prescription of Flexeril.          ED COURSE:    ED Observation Status? No   No noted need for observation for developing issue    INTERVENTIONS BY ME:  Medications   ondansetron (Zofran ODT) dispertab 4 mg (4 mg Oral Given 4/13/25 0033)   lidocaine (Xylocaine) 2 % injection 20 mL (20 mL Other Given by Provider 4/13/25 0045)       Response on recheck:  Discussed with the patient risks alternatives and benefits.  Risk include but not exclusive of infection abscess formation bone infection and bacteremia.  Patient verbalized understanding.  Procedure:   Patient was prepped with chlorhexidine several times.    Patient was in the sitting position.  Anatomic location of injection: L1-L2 paraspinal areas  A total of 2 cc of 2% lidocaine was injected in both paraspinal areas over L1-L2.  Dryly needling was also performed  Patient tolerated procedure well.  No complications  After injection signs and symptoms were discussed with the patient.  .      FINAL DISPO PLAN   New Prescriptions    CYCLOBENZAPRINE (FLEXERIL) 5 MG TABLET    Take 1-2 Tablets by mouth 3 times a day as needed for Muscle Spasms.    DIAZEPAM (VALIUM) 5 MG TAB    Take  1 Tablet by mouth every 8 hours as needed (Spasm) for up to 3 days.     Informed the patient that they will get narcotics.  Narcotic prescription drug monitoring check was done.  Patient deemed low risk at this time.  Instructions were given to the patient did not drink or drive while taking narcotic pain medication.  Patient was given under 3-day supply of medications.  Told that long-term medication pain management best suited by primary care doctor and/or pain management doctor.        Followup:  [facility] Emergency Dept  [address]  [place] [address] [phone]  Go to   If symptoms worsen      CONDITION: Above    35-year-old female with back pain third visit for the same problems no neurovascular compromise cauda equina syndrome osteomyelitis symptoms noted.  Patient responded well to trigger point injections.  Was given short course of vomiting very strict instructions not to take it while drinking driving or take them together.  The goal is to get her off the Valium and not the Flexeril as soon as possible..     FINAL IMPRESSION  1. Back strain, initial encounter    2. Muscle spasm    3.  Trigger point injections

## 2025-04-13 NOTE — ED TRIAGE NOTES
".  Chief Complaint   Patient presents with    Back Pain     Pt states that she has having back pain and N/V for a week but no fever.       .BP (!) 149/91   Pulse 94   Temp 36.4 °C (97.6 °F) (Temporal)   Resp 16   Ht 1.6 m (5' 3\")   Wt 52.6 kg (115 lb 15.4 oz)   SpO2 92%   BMI 20.54 kg/m²     "

## 2025-04-13 NOTE — DISCHARGE INSTRUCTIONS
Please make sure you do not take the Valium and the Flexeril together  My recommendation is you can start with the Flexeril in 4 hours if is not getting better out of Valium.  Then wait another 4 hours before considering Flexeril.    The other option is to take Valium 3 times a day only as needed for the next 2 days and then you can switch to Flexeril once it is feeling better    I still recommend ibuprofen while this helps.  And obviously return to the ER if you develop worsening symptoms

## 2025-07-03 DIAGNOSIS — F41.1 GAD (GENERALIZED ANXIETY DISORDER): ICD-10-CM

## 2025-07-07 RX ORDER — QUETIAPINE FUMARATE 25 MG/1
25 TABLET, FILM COATED ORAL EVERY EVENING
Qty: 90 TABLET | Refills: 1 | Status: SHIPPED | OUTPATIENT
Start: 2025-07-07

## 2025-07-07 NOTE — TELEPHONE ENCOUNTER
Requested Prescriptions     Signed Prescriptions Disp Refills    QUEtiapine (SEROQUEL) 25 MG Tab 90 Tablet 1     Sig: Take 1 Tablet by mouth every evening.     Authorizing Provider: EFREN IRENE A.P.R.N.